# Patient Record
Sex: MALE | Race: WHITE | Employment: FULL TIME | URBAN - METROPOLITAN AREA
[De-identification: names, ages, dates, MRNs, and addresses within clinical notes are randomized per-mention and may not be internally consistent; named-entity substitution may affect disease eponyms.]

---

## 2017-04-03 ENCOUNTER — APPOINTMENT (OUTPATIENT)
Dept: GENERAL RADIOLOGY | Age: 42
End: 2017-04-03
Attending: EMERGENCY MEDICINE
Payer: SELF-PAY

## 2017-04-03 ENCOUNTER — APPOINTMENT (OUTPATIENT)
Dept: ULTRASOUND IMAGING | Age: 42
End: 2017-04-03
Attending: EMERGENCY MEDICINE
Payer: SELF-PAY

## 2017-04-03 ENCOUNTER — APPOINTMENT (OUTPATIENT)
Dept: CT IMAGING | Age: 42
End: 2017-04-03
Payer: SELF-PAY

## 2017-04-03 ENCOUNTER — HOSPITAL ENCOUNTER (OUTPATIENT)
Age: 42
Setting detail: OBSERVATION
Discharge: OTHER INSTITUTION | End: 2017-04-03
Attending: EMERGENCY MEDICINE | Admitting: INTERNAL MEDICINE
Payer: SELF-PAY

## 2017-04-03 ENCOUNTER — HOSPITAL ENCOUNTER (INPATIENT)
Age: 42
LOS: 3 days | Discharge: HOME | DRG: 811 | End: 2017-04-06
Attending: INTERNAL MEDICINE | Admitting: INTERNAL MEDICINE
Payer: SELF-PAY

## 2017-04-03 VITALS
DIASTOLIC BLOOD PRESSURE: 79 MMHG | HEART RATE: 91 BPM | SYSTOLIC BLOOD PRESSURE: 129 MMHG | TEMPERATURE: 97.4 F | WEIGHT: 256 LBS | OXYGEN SATURATION: 95 % | BODY MASS INDEX: 38.8 KG/M2 | RESPIRATION RATE: 16 BRPM | HEIGHT: 68 IN

## 2017-04-03 DIAGNOSIS — D64.9 SYMPTOMATIC ANEMIA: Primary | ICD-10-CM

## 2017-04-03 DIAGNOSIS — I82.4Y2 DEEP VEIN THROMBOSIS (DVT) OF PROXIMAL VEIN OF LEFT LOWER EXTREMITY, UNSPECIFIED CHRONICITY (HCC): ICD-10-CM

## 2017-04-03 PROBLEM — I82.402 DEEP VEIN THROMBOSIS (DVT) OF LEFT LOWER EXTREMITY (HCC): Status: ACTIVE | Noted: 2017-04-03

## 2017-04-03 PROBLEM — E66.9 OBESITY: Status: ACTIVE | Noted: 2017-04-03

## 2017-04-03 LAB
ALBUMIN SERPL BCP-MCNC: 3.8 G/DL (ref 3.5–5)
ALBUMIN/GLOB SERPL: 1 {RATIO} (ref 1.2–3.5)
ALP SERPL-CCNC: 77 U/L (ref 50–136)
ALT SERPL-CCNC: 23 U/L (ref 12–65)
ANION GAP BLD CALC-SCNC: 6 MMOL/L (ref 7–16)
AST SERPL W P-5'-P-CCNC: 14 U/L (ref 15–37)
ATRIAL RATE: 101 BPM
BILIRUB DIRECT SERPL-MCNC: 0.1 MG/DL
BILIRUB SERPL-MCNC: 0.5 MG/DL (ref 0.2–1.1)
BUN SERPL-MCNC: 17 MG/DL (ref 6–23)
CALCIUM SERPL-MCNC: 9 MG/DL (ref 8.3–10.4)
CALCULATED P AXIS, ECG09: 51 DEGREES
CALCULATED R AXIS, ECG10: 44 DEGREES
CALCULATED T AXIS, ECG11: 5 DEGREES
CHLORIDE SERPL-SCNC: 103 MMOL/L (ref 98–107)
CO2 SERPL-SCNC: 28 MMOL/L (ref 21–32)
CREAT SERPL-MCNC: 1.17 MG/DL (ref 0.8–1.5)
D DIMER PPP FEU-MCNC: 4.26 UG/ML(FEU)
DIAGNOSIS, 93000: NORMAL
DIFFERENTIAL METHOD BLD: ABNORMAL
EOSINOPHIL # BLD: 0.1 K/UL (ref 0–0.8)
EOSINOPHIL NFR BLD MANUAL: 1 % (ref 1–8)
ERYTHROCYTE [DISTWIDTH] IN BLOOD BY AUTOMATED COUNT: 19 % (ref 11.9–14.6)
FERRITIN SERPL-MCNC: 5 NG/ML (ref 8–388)
FOLATE SERPL-MCNC: 4.5 NG/ML (ref 3.1–17.5)
GLOBULIN SER CALC-MCNC: 3.7 G/DL (ref 2.3–3.5)
GLUCOSE SERPL-MCNC: 103 MG/DL (ref 65–100)
HCT VFR BLD AUTO: 26.3 % (ref 41.1–50.3)
HGB BLD-MCNC: 6.1 G/DL (ref 13.6–17.2)
HGB BLD-MCNC: 6.6 G/DL (ref 13.6–17.2)
HGB RETIC QN AUTO: 13 PG (ref 29–35)
IMM RETICS NFR: 16.3 % (ref 2.3–13.4)
INR PPP: 1.1 (ref 0.9–1.2)
IRON SATN MFR SERPL: 3 %
IRON SERPL-MCNC: 14 UG/DL (ref 35–150)
LYMPHOCYTES # BLD: 1.6 K/UL (ref 0.5–4.6)
LYMPHOCYTES NFR BLD MANUAL: 16 % (ref 16–44)
MCH RBC QN AUTO: 14.7 PG (ref 26.1–32.9)
MCHC RBC AUTO-ENTMCNC: 25.1 G/DL (ref 31.4–35)
MCV RBC AUTO: 58.6 FL (ref 79.6–97.8)
MONOCYTES # BLD: 0.5 K/UL (ref 0.1–1.3)
MONOCYTES NFR BLD MANUAL: 5 % (ref 3–9)
NEUTS SEG # BLD: 7.6 K/UL (ref 1.7–8.2)
NEUTS SEG NFR BLD MANUAL: 78 % (ref 47–75)
P-R INTERVAL, ECG05: 150 MS
PLATELET # BLD AUTO: 378 K/UL (ref 150–450)
PLATELET COMMENTS,PCOM: ADEQUATE
PMV BLD AUTO: ABNORMAL FL (ref 10.8–14.1)
POTASSIUM SERPL-SCNC: 3.6 MMOL/L (ref 3.5–5.1)
PROT SERPL-MCNC: 7.5 G/DL (ref 6.3–8.2)
PROTHROMBIN TIME: 11.7 SEC (ref 9.6–12)
Q-T INTERVAL, ECG07: 330 MS
QRS DURATION, ECG06: 90 MS
QTC CALCULATION (BEZET), ECG08: 427 MS
RBC # BLD AUTO: 4.49 M/UL (ref 4.23–5.67)
RBC MORPH BLD: ABNORMAL
RETICS # AUTO: 0.09 M/UL (ref 0.03–0.1)
RETICS/RBC NFR AUTO: 2 % (ref 0.3–2)
SODIUM SERPL-SCNC: 137 MMOL/L (ref 136–145)
TIBC SERPL-MCNC: 422 UG/DL (ref 250–450)
TROPONIN I BLD-MCNC: 0 NG/ML (ref 0–0.08)
VENTRICULAR RATE, ECG03: 101 BPM
VIT B12 SERPL-MCNC: 964 PG/ML (ref 193–986)
WBC # BLD AUTO: 9.8 K/UL (ref 4.3–11.1)

## 2017-04-03 PROCEDURE — 74011250636 HC RX REV CODE- 250/636

## 2017-04-03 PROCEDURE — 83540 ASSAY OF IRON: CPT

## 2017-04-03 PROCEDURE — 99218 HC RM OBSERVATION: CPT

## 2017-04-03 PROCEDURE — 82728 ASSAY OF FERRITIN: CPT

## 2017-04-03 PROCEDURE — 74011000250 HC RX REV CODE- 250

## 2017-04-03 PROCEDURE — 85379 FIBRIN DEGRADATION QUANT: CPT | Performed by: EMERGENCY MEDICINE

## 2017-04-03 PROCEDURE — 82746 ASSAY OF FOLIC ACID SERUM: CPT

## 2017-04-03 PROCEDURE — 80076 HEPATIC FUNCTION PANEL: CPT

## 2017-04-03 PROCEDURE — 82607 VITAMIN B-12: CPT

## 2017-04-03 PROCEDURE — 65660000000 HC RM CCU STEPDOWN

## 2017-04-03 PROCEDURE — 96361 HYDRATE IV INFUSION ADD-ON: CPT | Performed by: EMERGENCY MEDICINE

## 2017-04-03 PROCEDURE — 30233N1 TRANSFUSION OF NONAUTOLOGOUS RED BLOOD CELLS INTO PERIPHERAL VEIN, PERCUTANEOUS APPROACH: ICD-10-PCS | Performed by: INTERNAL MEDICINE

## 2017-04-03 PROCEDURE — 86920 COMPATIBILITY TEST SPIN: CPT | Performed by: INTERNAL MEDICINE

## 2017-04-03 PROCEDURE — 85610 PROTHROMBIN TIME: CPT

## 2017-04-03 PROCEDURE — 99284 EMERGENCY DEPT VISIT MOD MDM: CPT | Performed by: EMERGENCY MEDICINE

## 2017-04-03 PROCEDURE — 74011250636 HC RX REV CODE- 250/636: Performed by: EMERGENCY MEDICINE

## 2017-04-03 PROCEDURE — 85046 RETICYTE/HGB CONCENTRATE: CPT

## 2017-04-03 PROCEDURE — 93971 EXTREMITY STUDY: CPT

## 2017-04-03 PROCEDURE — 93005 ELECTROCARDIOGRAM TRACING: CPT | Performed by: EMERGENCY MEDICINE

## 2017-04-03 PROCEDURE — 84484 ASSAY OF TROPONIN QUANT: CPT

## 2017-04-03 PROCEDURE — 96365 THER/PROPH/DIAG IV INF INIT: CPT | Performed by: EMERGENCY MEDICINE

## 2017-04-03 PROCEDURE — 74011000258 HC RX REV CODE- 258: Performed by: EMERGENCY MEDICINE

## 2017-04-03 PROCEDURE — 85018 HEMOGLOBIN: CPT

## 2017-04-03 PROCEDURE — 71010 XR CHEST PORT: CPT

## 2017-04-03 PROCEDURE — 85025 COMPLETE CBC W/AUTO DIFF WBC: CPT | Performed by: EMERGENCY MEDICINE

## 2017-04-03 PROCEDURE — 71260 CT THORAX DX C+: CPT

## 2017-04-03 PROCEDURE — 74011636320 HC RX REV CODE- 636/320: Performed by: EMERGENCY MEDICINE

## 2017-04-03 PROCEDURE — 80048 BASIC METABOLIC PNL TOTAL CA: CPT | Performed by: EMERGENCY MEDICINE

## 2017-04-03 PROCEDURE — 86900 BLOOD TYPING SEROLOGIC ABO: CPT | Performed by: INTERNAL MEDICINE

## 2017-04-03 PROCEDURE — 36415 COLL VENOUS BLD VENIPUNCTURE: CPT

## 2017-04-03 PROCEDURE — C9113 INJ PANTOPRAZOLE SODIUM, VIA: HCPCS

## 2017-04-03 RX ORDER — HEPARIN SODIUM 5000 [USP'U]/100ML
18-36 INJECTION, SOLUTION INTRAVENOUS
Status: CANCELLED | OUTPATIENT
Start: 2017-04-03 | Stop reason: SDUPTHER

## 2017-04-03 RX ORDER — SODIUM CHLORIDE 9 MG/ML
250 INJECTION, SOLUTION INTRAVENOUS AS NEEDED
Status: DISCONTINUED | OUTPATIENT
Start: 2017-04-03 | End: 2017-04-04 | Stop reason: SDUPTHER

## 2017-04-03 RX ORDER — SODIUM CHLORIDE 0.9 % (FLUSH) 0.9 %
10 SYRINGE (ML) INJECTION
Status: COMPLETED | OUTPATIENT
Start: 2017-04-03 | End: 2017-04-03

## 2017-04-03 RX ORDER — SODIUM CHLORIDE 9 MG/ML
1000 INJECTION, SOLUTION INTRAVENOUS ONCE
Status: COMPLETED | OUTPATIENT
Start: 2017-04-03 | End: 2017-04-03

## 2017-04-03 RX ORDER — HEPARIN SODIUM 5000 [USP'U]/100ML
18-36 INJECTION, SOLUTION INTRAVENOUS
Status: DISCONTINUED | OUTPATIENT
Start: 2017-04-03 | End: 2017-04-03 | Stop reason: SDUPTHER

## 2017-04-03 RX ORDER — HEPARIN SODIUM 5000 [USP'U]/100ML
18-36 INJECTION, SOLUTION INTRAVENOUS
Status: DISCONTINUED | OUTPATIENT
Start: 2017-04-03 | End: 2017-04-03 | Stop reason: HOSPADM

## 2017-04-03 RX ORDER — SODIUM CHLORIDE 9 MG/ML
250 INJECTION, SOLUTION INTRAVENOUS AS NEEDED
Status: DISCONTINUED | OUTPATIENT
Start: 2017-04-03 | End: 2017-04-03 | Stop reason: HOSPADM

## 2017-04-03 RX ORDER — HEPARIN SODIUM 5000 [USP'U]/ML
5000 INJECTION, SOLUTION INTRAVENOUS; SUBCUTANEOUS ONCE
Status: COMPLETED | OUTPATIENT
Start: 2017-04-03 | End: 2017-04-03

## 2017-04-03 RX ORDER — SODIUM CHLORIDE 9 MG/ML
250 INJECTION, SOLUTION INTRAVENOUS AS NEEDED
Status: CANCELLED | OUTPATIENT
Start: 2017-04-03

## 2017-04-03 RX ADMIN — Medication 10 ML: at 18:08

## 2017-04-03 RX ADMIN — SODIUM CHLORIDE 100 ML: 900 INJECTION, SOLUTION INTRAVENOUS at 18:08

## 2017-04-03 RX ADMIN — SODIUM CHLORIDE 1000 ML: 900 INJECTION, SOLUTION INTRAVENOUS at 15:47

## 2017-04-03 RX ADMIN — IOPAMIDOL 100 ML: 755 INJECTION, SOLUTION INTRAVENOUS at 18:08

## 2017-04-03 RX ADMIN — HEPARIN SODIUM 5000 UNITS: 5000 INJECTION, SOLUTION INTRAVENOUS; SUBCUTANEOUS at 19:05

## 2017-04-03 RX ADMIN — SODIUM CHLORIDE 40 MG: 9 INJECTION INTRAMUSCULAR; INTRAVENOUS; SUBCUTANEOUS at 21:00

## 2017-04-03 RX ADMIN — HEPARIN SODIUM AND DEXTROSE 18 UNITS/KG/HR: 5000; 5 INJECTION INTRAVENOUS at 19:06

## 2017-04-03 NOTE — ED NOTES
TRANSFER - OUT REPORT:    Verbal report given to Mery Gold RN(name) on Cherri Alston  being transferred to [facility] room 607(unit) for routine progression of care       Report consisted of patients Situation, Background, Assessment and   Recommendations(SBAR). Information from the following report(s) SBAR, ED Summary, Procedure Summary, MAR and Recent Results was reviewed with the receiving nurse. Lines:   Peripheral IV 04/03/17 Right Antecubital (Active)   Site Assessment Clean, dry, & intact 4/3/2017  7:00 PM   Phlebitis Assessment 0 4/3/2017  7:00 PM   Infiltration Assessment 0 4/3/2017  3:40 PM   Dressing Status Clean, dry, & intact 4/3/2017  7:00 PM   Dressing Type Transparent 4/3/2017  3:40 PM   Hub Color/Line Status Green 4/3/2017  7:00 PM        Opportunity for questions and clarification was provided. Patient transported with:  [facility] [place] [facility].   Susy Montenegro RN

## 2017-04-03 NOTE — ED NOTES
Attempted to call report on patient to [facility]. Nurse to call back for report.       Gilma Avalos RN

## 2017-04-03 NOTE — H&P
HOSPITALIST H&P  NAME:  Ruma Tavarez   Age:  38 y.o.  :   1975   MRN:   [id number]  PCP: None  Treatment Team: Attending Provider: Jasbir Maria MD  FULL CODE  HPI:   Please refer to the 4/3/17 admission H&P from [facility], [facility] for details of presentation. In summary, Ruma Tavarez is a 38 y.o. male that presented to the ED with 2 week history of dyspnea. He denied associated chest pain. He also reported LLE pain and swelling several weeks ago that spontaneously resolved. 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 Work up in the ED showed Hgb 6.6 with stool reportedly heme negative and LLE DVT. He denied overt blood loss including hematemesis, coffee ground emesis, hematochezia, melena, or hematuria. He routinely takes Ibuprofen 200 mg once daily. The patient was initially admitted to [facility], [facility] and has been seen and examined with supervising physician, Dr Yaya Martins; he is stable and requires transfer downtown for further evaluation and treatment. Dr Yaya Martins has discussed patient with accepting hospitalist Dr. Franck Schulte and well as with Dr. Lou James with GI.         Results summary of Diagnostic Studies/Procedures copied from within [facility] EMR:  CXR  IMPRESSION: No acute airspace disease.      LLE US   FINDINGS: There is normal flow in the common femoral vein. Abnormal intraluminal  echoes in the femoral vein and popliteal vein and calf veins. There is  noncompressibility.   IMPRESSION: Positive for deep venous thrombosis left lower extremity    Complete ROS done and is as stated in HPI or otherwise negative  Past Medical History:   Diagnosis Date    Ill-defined condition     Heart murmur       Past Surgical History:   Procedure Laterality Date    HX ORTHOPAEDIC      right foot         No Known Allergies   Social History   Substance Use Topics    Smoking status: Never Smoker    Smokeless tobacco: Not on file    Alcohol use No      Family History   Problem Relation Age of Onset    Lupus Mother         Objective:     Visit Vitals    /71    Pulse 93    Temp 97.4 °F (36.3 °C)    Resp 13    Ht 5' 8\" (1.727 m)    Wt 116.1 kg (256 lb)    SpO2 97%    BMI 38.92 kg/m2     Temp (24hrs), Av.4 °F (36.3 °C), Min:97.4 °F (36.3 °C), Max:97.4 °F (36.3 °C)     Physical Exam:  General:    Alert, cooperative, no distress, appears stated age. Head:   Normocephalic, without obvious abnormality, atraumatic. Nose:  Nares normal. No drainage or sinus tenderness. Lungs:   CTA  Heart:  Tachycardic and regular   Abdomen:   Soft, non-tender. Not distended. Bowel sounds normal. No masses  Extremities: No cyanosis. No edema. No clubbing  Skin:     Texture, turgor normal. No rashes or lesions. Not Jaundiced  Neurologic: Alert and oriented times 4, non-focal   Data Review:   Recent Results (from the past 24 hour(s))   EKG, 12 LEAD, INITIAL    Collection Time: 17  2:41 PM   Result Value Ref Range    Ventricular Rate 101 BPM    Atrial Rate 101 BPM    P-R Interval 150 ms    QRS Duration 90 ms    Q-T Interval 330 ms    QTC Calculation (Bezet) 427 ms    Calculated P Axis 51 degrees    Calculated R Axis 44 degrees    Calculated T Axis 5 degrees    Diagnosis       !! AGE AND GENDER SPECIFIC ECG ANALYSIS !!   Sinus tachycardia  ST & T wave abnormality, consider inferior ischemia  Abnormal ECG  No previous ECGs available  Confirmed by Darrel Conner MD (), ARNULFO ARAUJO ([id number]) on 4/3/2017 3:11:08 PM     POC TROPONIN-I    Collection Time: 17  3:34 PM   Result Value Ref Range    Troponin-I (POC) 0 0.0 - 0.08 ng/ml   CBC WITH AUTOMATED DIFF    Collection Time: 17  3:40 PM   Result Value Ref Range    WBC 9.8 4.3 - 11.1 K/uL    RBC 4.49 4.23 - 5.67 M/uL    HGB 6.6 (LL) 13.6 - 17.2 g/dL    HCT 26.3 (L) 41.1 - 50.3 %    MCV 58.6 (L) 79.6 - 97.8 FL    MCH 14.7 (L) 26.1 - 32.9 PG    MCHC 25.1 (L) 31.4 - 35.0 g/dL    RDW 19.0 (H) 11.9 - 14.6 %    PLATELET 135 090 - 141 K/uL    MPV Cannot be calulated 10.8 - 14.1 FL    NEUTROPHILS 78 (H) 47 - 75 %    LYMPHOCYTES 16 16 - 44 %    MONOCYTES 5 3 - 9 %    EOSINOPHILS 1 1 - 8 %    ABS. NEUTROPHILS 7.6 1.7 - 8.2 K/UL    ABS. LYMPHOCYTES 1.6 0.5 - 4.6 K/UL    ABS. MONOCYTES 0.5 0.1 - 1.3 K/UL    ABS. EOSINOPHILS 0.1 0.0 - 0.8 K/UL    RBC COMMENTS OCCASIONAL  ANISOCYTOSIS        RBC COMMENTS SLIGHT  HYPOCHROMIA        RBC COMMENTS MODERATE  POLYCHROMASIA        RBC COMMENTS OCCASIONAL  OVALOCYTES        PLATELET COMMENTS ADEQUATE      DF MANUAL     METABOLIC PANEL, BASIC    Collection Time: 04/03/17  3:40 PM   Result Value Ref Range    Sodium 137 136 - 145 mmol/L    Potassium 3.6 3.5 - 5.1 mmol/L    Chloride 103 98 - 107 mmol/L    CO2 28 21 - 32 mmol/L    Anion gap 6 (L) 7 - 16 mmol/L    Glucose 103 (H) 65 - 100 mg/dL    BUN 17 6 - 23 MG/DL    Creatinine 1.17 0.8 - 1.5 MG/DL    GFR est AA >60 >60 ml/min/1.73m2    GFR est non-AA >60 >60 ml/min/1.73m2    Calcium 9.0 8.3 - 10.4 MG/DL   D DIMER    Collection Time: 04/03/17  3:40 PM   Result Value Ref Range    D DIMER 4.26 (HH) <0.55 ug/ml(FEU)   PROTHROMBIN TIME + INR    Collection Time: 04/03/17  3:40 PM   Result Value Ref Range    Prothrombin time 11.7 9.6 - 12.0 sec    INR 1.1 0.9 - 1.2     HEPATIC FUNCTION PANEL    Collection Time: 04/03/17  3:40 PM   Result Value Ref Range    Protein, total 7.5 6.3 - 8.2 g/dL    Albumin 3.8 3.5 - 5.0 g/dL    Globulin 3.7 (H) 2.3 - 3.5 g/dL    A-G Ratio 1.0 (L) 1.2 - 3.5      Bilirubin, total 0.5 0.2 - 1.1 MG/DL    Bilirubin, direct 0.1 <0.4 MG/DL    Alk.  phosphatase 77 50 - 136 U/L    AST (SGOT) 14 (L) 15 - 37 U/L    ALT (SGPT) 23 12 - 65 U/L   RETICULOCYTE COUNT    Collection Time: 04/03/17  3:40 PM   Result Value Ref Range    Reticulocyte count 2.0 0.3 - 2.0 %    Absolute Retic Cnt. 0.0876 0.026 - 0.095 M/ul    Immature Retic Fraction 16.3 (H) 2.3 - 13.4 %    Retic Hgb Conc. 13 (L) 29 - 35 pg   FERRITIN    Collection Time: 04/03/17  3:40 PM   Result Value Ref Range    Ferritin 5 (L) 8 - 388 NG/ML   TRANSFERRIN SATURATION    Collection Time: 04/03/17  3:40 PM   Result Value Ref Range    Iron 14 (L) 35 - 150 ug/dL    TIBC 422 250 - 450 ug/dL    Transferrin Saturation 3 (L) >20 %       Assessment and Plan:           Active Hospital Problems     Diagnosis Date Noted    Symptomatic anemia 04/03/2017    Obesity 04/03/2017    Deep vein thrombosis (DVT) of left lower extremity (HCC) 04/03/2017   Admit to remote telemetry   IV PPI  Transfuse 2 PRBC  Hgb q8h  Consult GI  NPO after midnight   Heparin infusion   Consult IR for IVC filter  CT Chest, Abdomen and Pelvis   Follow up labs   Estimated length of stay:>2 midnights   Benoit Mcintyre, PA

## 2017-04-03 NOTE — H&P
HOSPITALIST H&P  NAME:  Ninoska Jacobo   Age:  38 y.o.  :   1975   MRN:   [id number]  PCP: None  Treatment Team: Attending Provider: Forest Packer DO; Primary Nurse: Myke Bonilla, MYRANDA; Care Manager: Belkis Carbone RN  No Order   HPI:   Ninoska Jacobo is a 38 y.o. male that presented to the ED with 2 week history of dyspnea. He denies associated chest pain. He also reports LLE pain and swelling several weeks ago that spontaneously resolved. 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 Work up in the ED shows Hgb 6.6 with stool reportedly heme negative and LLE DVT. He denies overt blood loss including hematemesis, coffee ground emesis, hematochezia, melena, or hematuria. He routinely takes Ibuprofen 200 mg once daily. The hospitalist have been asked to admit. Results summary of Diagnostic Studies/Procedures copied from within [facility] EMR:   CXR  IMPRESSION: No acute airspace disease. LLE US   FINDINGS: There is normal flow in the common femoral vein. Abnormal intraluminal  echoes in the femoral vein and popliteal vein and calf veins. There is  noncompressibility.   IMPRESSION: Positive for deep venous thrombosis left lower extremity       Complete ROS done and is as stated in HPI or otherwise negative  Past Medical History:   Diagnosis Date    Ill-defined condition     Heart murmur       Past Surgical History:   Procedure Laterality Date    HX ORTHOPAEDIC      right foot       None     No Known Allergies   Social History   Substance Use Topics    Smoking status: Never Smoker    Smokeless tobacco: Not on file    Alcohol use No      Family History   Problem Relation Age of Onset    Lupus Mother          Objective:     Visit Vitals    /90 (BP 1 Location: Left arm, BP Patient Position: Sitting)    Pulse (!) 111    Temp 97.4 °F (36.3 °C)    Resp 22    Ht 5' 8\" (1.727 m)    Wt 116.1 kg (256 lb)    SpO2 96%    BMI 38.92 kg/m2      Temp (24hrs), Av.4 °F (36.3 °C), Min:97.4 °F (36.3 °C), Max:97.4 °F (36.3 °C)    Oxygen Therapy  O2 Sat (%): 96 % (17 1423)  O2 Device: Room air (17 1423)  Physical Exam:  General:    Alert, cooperative, no distress, appears stated age. Head:   Normocephalic, without obvious abnormality, atraumatic. Nose:  Nares normal. No drainage or sinus tenderness. Lungs:   CTA  Heart:  Tachycardic and regular   Abdomen:   Soft, non-tender. Not distended. Bowel sounds normal. No masses  Extremities: No cyanosis. No edema. No clubbing  Skin:     Texture, turgor normal. No rashes or lesions. Not Jaundiced  Neurologic: Alert and oriented times 4, non-focal   Data Review:   Recent Results (from the past 24 hour(s))   EKG, 12 LEAD, INITIAL    Collection Time: 17  2:41 PM   Result Value Ref Range    Ventricular Rate 101 BPM    Atrial Rate 101 BPM    P-R Interval 150 ms    QRS Duration 90 ms    Q-T Interval 330 ms    QTC Calculation (Bezet) 427 ms    Calculated P Axis 51 degrees    Calculated R Axis 44 degrees    Calculated T Axis 5 degrees    Diagnosis       !! AGE AND GENDER SPECIFIC ECG ANALYSIS !!   Sinus tachycardia  ST & T wave abnormality, consider inferior ischemia  Abnormal ECG  No previous ECGs available  Confirmed by Raymundo Bustamante MD (), ARNULFO ARAUJO ([id number]) on 4/3/2017 3:11:08 PM     POC TROPONIN-I    Collection Time: 17  3:34 PM   Result Value Ref Range    Troponin-I (POC) 0 0.0 - 0.08 ng/ml   CBC WITH AUTOMATED DIFF    Collection Time: 17  3:40 PM   Result Value Ref Range    WBC 9.8 4.3 - 11.1 K/uL    RBC 4.49 4.23 - 5.67 M/uL    HGB 6.6 (LL) 13.6 - 17.2 g/dL    HCT 26.3 (L) 41.1 - 50.3 %    MCV 58.6 (L) 79.6 - 97.8 FL    MCH 14.7 (L) 26.1 - 32.9 PG    MCHC 25.1 (L) 31.4 - 35.0 g/dL    RDW 19.0 (H) 11.9 - 14.6 %    PLATELET 102 960 - 907 K/uL    MPV Cannot be calulated 10.8 - 14.1 FL    NEUTROPHILS 78 (H) 47 - 75 %    LYMPHOCYTES 16 16 - 44 %    MONOCYTES 5 3 - 9 % EOSINOPHILS 1 1 - 8 %    ABS. NEUTROPHILS 7.6 1.7 - 8.2 K/UL    ABS. LYMPHOCYTES 1.6 0.5 - 4.6 K/UL    ABS. MONOCYTES 0.5 0.1 - 1.3 K/UL    ABS. EOSINOPHILS 0.1 0.0 - 0.8 K/UL    RBC COMMENTS OCCASIONAL  ANISOCYTOSIS        RBC COMMENTS SLIGHT  HYPOCHROMIA        RBC COMMENTS MODERATE  POLYCHROMASIA        RBC COMMENTS OCCASIONAL  OVALOCYTES        PLATELET COMMENTS ADEQUATE      DF MANUAL     METABOLIC PANEL, BASIC    Collection Time: 04/03/17  3:40 PM   Result Value Ref Range    Sodium 137 136 - 145 mmol/L    Potassium 3.6 3.5 - 5.1 mmol/L    Chloride 103 98 - 107 mmol/L    CO2 28 21 - 32 mmol/L    Anion gap 6 (L) 7 - 16 mmol/L    Glucose 103 (H) 65 - 100 mg/dL    BUN 17 6 - 23 MG/DL    Creatinine 1.17 0.8 - 1.5 MG/DL    GFR est AA >60 >60 ml/min/1.73m2    GFR est non-AA >60 >60 ml/min/1.73m2    Calcium 9.0 8.3 - 10.4 MG/DL   D DIMER    Collection Time: 04/03/17  3:40 PM   Result Value Ref Range    D DIMER 4.26 (HH) <0.55 ug/ml(FEU)   PROTHROMBIN TIME + INR    Collection Time: 04/03/17  3:40 PM   Result Value Ref Range    Prothrombin time 11.7 9.6 - 12.0 sec    INR 1.1 0.9 - 1.2     HEPATIC FUNCTION PANEL    Collection Time: 04/03/17  3:40 PM   Result Value Ref Range    Protein, total 7.5 6.3 - 8.2 g/dL    Albumin 3.8 3.5 - 5.0 g/dL    Globulin 3.7 (H) 2.3 - 3.5 g/dL    A-G Ratio 1.0 (L) 1.2 - 3.5      Bilirubin, total 0.5 0.2 - 1.1 MG/DL    Bilirubin, direct 0.1 <0.4 MG/DL    Alk.  phosphatase 77 50 - 136 U/L    AST (SGOT) 14 (L) 15 - 37 U/L    ALT (SGPT) 23 12 - 65 U/L   RETICULOCYTE COUNT    Collection Time: 04/03/17  3:40 PM   Result Value Ref Range    Reticulocyte count 2.0 0.3 - 2.0 %    Absolute Retic Cnt. 0.0876 0.026 - 0.095 M/ul    Immature Retic Fraction 16.3 (H) 2.3 - 13.4 %    Retic Hgb Conc. 13 (L) 29 - 35 pg   FERRITIN    Collection Time: 04/03/17  3:40 PM   Result Value Ref Range    Ferritin 5 (L) 8 - 388 NG/ML   TRANSFERRIN SATURATION    Collection Time: 04/03/17  3:40 PM   Result Value Ref Range Iron 14 (L) 35 - 150 ug/dL    TIBC 422 250 - 450 ug/dL    Transferrin Saturation 3 (L) >20 %       Assessment and Plan:      Active Hospital Problems    Diagnosis Date Noted    Symptomatic anemia 04/03/2017    Obesity 04/03/2017    Deep vein thrombosis (DVT) of left lower extremity (HCC) 04/03/2017   Admit to remote telemetry   IV PPI  Transfuse 2 PRBC  Hgb q8h  Consult GI  NPO after midnight   Heparin infusion   Consult IR for IVC filter  CT Chest, Abdomen and Pelvis   Follow up labs   Estimated length of stay:>2 midnights   Arline Salcido., PA

## 2017-04-03 NOTE — ED NOTES
Attempted to call report, primary RN nor charge nurse available to take report. Nurse to call back for report.       Elder Savage RN

## 2017-04-03 NOTE — PROGRESS NOTES
Visited with patient as no PCP listed in chart. Wife Alondra Pham is at bedside. Patient states he does not have a PCP as he has no insurance. Explained importance of being established with a PCP. Patient has recently relocated from [place], [place] and is residing in [place]. Call to Mayra Osborn with [facility] who confirms that patient would not qualify for [facility] and they do not have [facility] in [place]. Resources provided and explained for [place] free clinic as well as [employer]. Encouraged patient to call and get established.

## 2017-04-03 NOTE — ED PROVIDER NOTES
Patient is a 38 y.o. male presenting with shortness of breath. The history is provided by the patient and the spouse. Shortness of Breath   This is a recurrent problem. The problem occurs frequently. The current episode started more than 1 week ago. The problem has not changed since onset. Associated symptoms include leg swelling. Pertinent negatives include no fever, no rhinorrhea, no sore throat, no cough, no hemoptysis, no chest pain, no syncope, no vomiting and no leg pain. He has tried nothing for the symptoms. He has had no prior hospitalizations. He has had no prior ED visits. He has had no prior ICU admissions. Associated medical issues do not include PE, CAD, past MI or DVT. Past Medical History:   Diagnosis Date    Ill-defined condition     Heart murmur        Past Surgical History:   Procedure Laterality Date    HX ORTHOPAEDIC      right foot          History reviewed. No pertinent family history. Social History     Social History    Marital status:      Spouse name: N/A    Number of children: N/A    Years of education: N/A     Occupational History    Not on file. Social History Main Topics    Smoking status: Never Smoker    Smokeless tobacco: Not on file    Alcohol use No    Drug use: Not on file    Sexual activity: Not on file     Other Topics Concern    Not on file     Social History Narrative    No narrative on file         ALLERGIES: Review of patient's allergies indicates no known allergies. Review of Systems   Constitutional: Negative. Negative for fever. HENT: Negative for rhinorrhea and sore throat. Respiratory: Positive for shortness of breath. Negative for cough and hemoptysis. Cardiovascular: Positive for leg swelling. Negative for chest pain and syncope. Gastrointestinal: Negative. Negative for vomiting. Endocrine: Negative. Genitourinary: Negative. Musculoskeletal: Negative. Skin: Negative. Neurological: Negative. Hematological: Negative. Vitals:    04/03/17 1423   BP: 123/90   Pulse: (!) 111   Resp: 22   Temp: 97.4 °F (36.3 °C)   SpO2: 96%   Weight: 116.1 kg (256 lb)   Height: 5' 8\" (1.727 m)            Physical Exam   Constitutional: He is oriented to person, place, and time. He appears well-developed and well-nourished. No distress. HENT:   Head: Normocephalic and atraumatic. Cardiovascular: Intact distal pulses. Pulmonary/Chest: Effort normal. No respiratory distress. He has no wheezes. He has no rales. Abdominal: Soft. He exhibits no distension. There is no tenderness. There is no rebound. Genitourinary: Rectal exam shows no external hemorrhoid and guaiac negative stool. Musculoskeletal: He exhibits no edema. Neurological: He is alert and oriented to person, place, and time. Skin: Skin is warm and dry. Psychiatric: He has a normal mood and affect. His behavior is normal.   Nursing note and vitals reviewed. MDM  Number of Diagnoses or Management Options  Symptomatic anemia: new and requires workup  Diagnosis management comments: Patient states that he is primarily having symptoms with exertion and is relatively symptomatically while at rest.  Denies any pain but has a significant shortness of breath and easily fatigued. Medicine having a past medical history per patient. Denies taking any chronic medications. Was recently on antibiotics and steroids after being seen in an urgent care for same complaint. Denies tobacco abuse, drug abuse or over-the-counter supplementations. Reviewed findings with patient and spouse.        Amount and/or Complexity of Data Reviewed  Clinical lab tests: ordered and reviewed (Results for orders placed or performed during the hospital encounter of 04/03/17  -CBC WITH AUTOMATED DIFF       Result                                            Value                         Ref Range                       WBC                                               9.8 4.3 - 11.1 K/uL                 RBC                                               4.49                          4.23 - 5.67 M/uL                HGB                                               6.6 (LL)                      13.6 - 17.2 g/dL                HCT                                               26.3 (L)                      41.1 - 50.3 %                   MCV                                               58.6 (L)                      79.6 - 97.8 FL                  MCH                                               14.7 (L)                      26.1 - 32.9 PG                  MCHC                                              25.1 (L)                      31.4 - 35.0 g/dL                RDW                                               19.0 (H)                      11.9 - 14.6 %                   PLATELET                                          378                           150 - 450 K/uL                  MPV                                               Cannot be calulated           10.8 - 14.1 FL                  DF                                                PENDING                                                  -METABOLIC PANEL, BASIC       Result                                            Value                         Ref Range                       Sodium                                            137                           136 - 145 mmol/L                Potassium                                         3.6                           3.5 - 5.1 mmol/L                Chloride                                          103                           98 - 107 mmol/L                 CO2                                               28                            21 - 32 mmol/L                  Anion gap                                         6 (L)                         7 - 16 mmol/L                   Glucose                                           103 (H) 65 - 100 mg/dL                  BUN                                               17                            6 - 23 MG/DL                    Creatinine                                        1.17                          0.8 - 1.5 MG/DL                 GFR est AA                                        >60                           >60 ml/min/1.73m2               GFR est non-AA                                    >60                           >60 ml/min/1.73m2               Calcium                                           9.0                           8.3 - 10.4 MG/DL           -D DIMER       Result                                            Value                         Ref Range                       D DIMER                                           4.26 (HH)                     <0.55 ug/ml(FEU)           -POC TROPONIN-I       Result                                            Value                         Ref Range                       Troponin-I (POC)                                  0                             0.0 - 0.08 ng/ml           -EKG, 12 LEAD, INITIAL       Result                                            Value                         Ref Range                       Ventricular Rate                                  101                           BPM                             Atrial Rate                                       101                           BPM                             P-R Interval                                      150                           ms                              QRS Duration                                      90                            ms                              Q-T Interval                                      330                           ms                              QTC Calculation (Bezet)                           427                           ms                              Calculated P Axis                                 51 degrees                         Calculated R Axis                                 44                            degrees                         Calculated T Axis                                 5                             degrees                         Diagnosis                                                                                                   !! AGE AND GENDER SPECIFIC ECG ANALYSIS !! Sinus tachycardia   ST & T wave abnormality, consider inferior ischemia   Abnormal ECG   No previous ECGs available   Confirmed by [facility] IGNACIA EGAN (), ARNULFO ARAUJO ([id number]) on 4/3/2017 3:11:08 PM     )  Tests in the radiology section of CPT®: reviewed and ordered (Xr Chest Port    Result Date: 4/3/2017  Chest portable CLINICAL INDICATION: Acute moderate dyspnea COMPARISON: None TECHNIQUE: single AP portable view chest at 2:30 PM semiupright FINDINGS:  There is no evidence of consolidation, pneumothorax, pleural effusion or pulmonary edema. Unremarkable hilar contours. Heart is borderline enlarged. There is smoothly contoured retrocardiac density along midline within the inferior mediastinum, most compatible with hiatal hernia rather than aortic dilatation. The bones are unremarkable     IMPRESSION: No acute airspace disease.      )      ED Course       Procedures

## 2017-04-03 NOTE — DISCHARGE SUMMARY
Hospitalist Discharge Summary   Patient ID:  Cherylene Mullet  [id number]  12 y.o.  1975  Admit date: 4/3/2017  2:28 PM  Discharge date and time: 4/3/2017  Attending: Fernanda Walter DO  PCP:  None  Treatment Team: Attending Provider: Fernanda Walter DO; Primary Nurse: Jarvis Libman, RN; Care Manager: Geovany Short, MYRANDA; Consulting Provider: Simin Massey MD  Principal Diagnosis <principal problem not specified>   Active Problems:    Symptomatic anemia (4/3/2017)      Obesity (4/3/2017)      Deep vein thrombosis (DVT) of left lower extremity ([facility] [address] (4/3/2017)       * Admission Diagnoses: symptomatic anemia  Symptomatic anemia  * Discharge Diagnoses:    Hospital Problems as of 4/3/2017  Never Reviewed          Codes Class Noted - Resolved POA    Symptomatic anemia ICD-10-CM: D64.9  ICD-9-CM: 285.9  4/3/2017 - Present Unknown        Obesity ICD-10-CM: E66.9  ICD-9-CM: 278.00  4/3/2017 - Present Unknown        Deep vein thrombosis (DVT) of left lower extremity ([facility] [address] ICD-10-CM: I82.402  ICD-9-CM: 453.40  4/3/2017 - Present Unknown            Hospital Course:  Please refer to the admission H&P for details of presentation. In summary, Cherylene Mullet is a 38 y.o. male that presented to the ED with 2 week history of dyspnea. He denied associated chest pain. He also reported LLE pain and swelling several weeks ago that spontaneously resolved. 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 Work up in the ED showed Hgb 6.6 with stool reportedly heme negative and LLE DVT. He denied overt blood loss including hematemesis, coffee ground emesis, hematochezia, melena, or hematuria. He routinely takes Ibuprofen 200 mg once daily. The patient was initially admitted to [facility], [facility] and has been seen and examined with supervising physician, Dr Misty Peterson; he is stable and requires transfer downtown for further evaluation and treatment.  Dr Misty Peterson has discussed patient with accepting hospitalist Dr. Severiano Mediate and well as with Dr. Nanci Henderson with GI. Results summary of Diagnostic Studies/Procedures copied from within [facility] EMR:  CXR  IMPRESSION: No acute airspace disease.     LLE US   FINDINGS: There is normal flow in the common femoral vein. Abnormal intraluminal  echoes in the femoral vein and popliteal vein and calf veins. There is  noncompressibility. IMPRESSION: Positive for deep venous thrombosis left lower extremity            Labs: Results:       Chemistry Recent Labs      04/03/17   1540   GLU  103*   NA  137   K  3.6   CL  103   CO2  28   BUN  17   CREA  1.17   CA  9.0   AGAP  6*   TBILI  0.5   ALT  23   AP  77   TP  7.5   ALB  3.8   GLOB  3.7*   AGRAT  1.0*      CBC w/Diff Recent Labs      04/03/17   1540   WBC  9.8   RBC  4.49   HGB  6.6*   HCT  26.3*   PLT  378   GRANS  78*   LYMPH  16   EOS  1          Coagulation Recent Labs      04/03/17   1540   PTP  11.7   INR  1.1               Liver Enzymes Recent Labs      04/03/17   1540   TP  7.5   ALB  3.8   AP  77   SGOT  14*              Discharge Exam:  Visit Vitals    /71    Pulse 93    Temp 97.4 °F (36.3 °C)    Resp 13    Ht 5' 8\" (1.727 m)    Wt 116.1 kg (256 lb)    SpO2 97%    BMI 38.92 kg/m2     General appearance: alert, cooperative, no distress, appears stated age  Lungs: clear to auscultation bilaterally  Heart: regular rate and rhythm  Abdomen: soft, non-tender.  Bowel sounds normal.   Extremities: no cyanosis or edema    Current Facility-Administered Medications   Medication Dose Route Frequency    0.9% sodium chloride infusion 250 mL  250 mL IntraVENous PRN    heparin (porcine) injection 5,000 Units  5,000 Units IntraVENous ONCE    heparin 25,000 units in dextrose 500 mL infusion  18-36 Units/kg/hr IntraVENous TITRATE    pantoprazole (PROTONIX) 40 mg in sodium chloride 0.9 % 10 mL injection  40 mg IntraVENous Q12H           Disposition: Admify Discharge Condition: stable  Time spent to discharge patient <30 minutes   Signed:  JEANIE Eddy  4/3/2017  6:28 PM

## 2017-04-03 NOTE — ED TRIAGE NOTES
Patient c/o increasing dry cough, fatigue and shortness of breath x 2 months. Patient c/o posterior left calf pain yesterday but has resolved today.

## 2017-04-03 NOTE — Clinical Note
Patient Class[de-identified] Observation [002] Type of Bed: Medical [8] Reason for Observation: symptomatic anemia Admitting Physician: Melisa Marques [[id number]] Attending Physician: Melisa Marques [[id number]] Diagnosis: symptomatic anemia

## 2017-04-03 NOTE — IP AVS SNAPSHOT
[facility] 
 
 
 [address] [place] [address] 322 [facility] 
[phone] Patient: Phuong Knight MRN: [id number] AGL:[birth year] You are allergic to the following No active allergies Recent Documentation Weight BMI Smoking Status 116.1 kg 38.92 kg/m2 Never Smoker Unresulted Labs Order Current Status CELIAC ANTIBODY PROFILE In process Emergency Contacts Name Discharge Info Relation Home Work Mobile Ange Jeong  Spouse [3]   [phone] About your hospitalization You were admitted on:  April 3, 2017 You last received care in the:  [facility] 6 MED SURG You were discharged on:  April 6, 2017 Unit phone number:  [phone] Why you were hospitalized Your primary diagnosis was:  Not on File Your diagnoses also included:  Symptomatic Anemia Providers Seen During Your Hospitalizations Provider Role Specialty Primary office phone Mendoza Santa MD Attending Provider Internal Medicine [phone] Your Primary Care Physician (PCP) Primary Care Physician Office Phone Office Fax NONE ** None ** ** None ** Follow-up Information Follow up With Details Comments Contact Info [facility]   Monday April 17 at 8:20 am 
 
[facility]  they will contact you for further testing [facility]  
[address] [facility] [address] [phone] Kevin Adames MD On 4/19/2017 11:00 [address] [facility] [address] 9181 [facility] [address] 
[phone] Your Appointments Thursday April 13, 2017 11:00 AM EDT Infusion with FLR5 INF[facility] ([address] [place] [address]) 5th Floor Infusion [address] [facility] [address] Jose Magaña [phone]  [facility] [address]  
[phone] For [facility] AND SURGICAL [facility] Floor [phone] ext. [phone] [facility]  Wednesday April 19, 2017 11:00 AM EDT  
 LAB with [address]  
1808 [facility] OUTREACH INSURANCE ([address]) Stephanie Mcgee 426 187 [facility]  
[phone] Wednesday April 19, 2017 11:30 AM EDT HOSPITAL FOLLOW-UP with Joselin Nava MD  
[facility] Hematology and Oncology [facility]) C/ John Back [address] [facility] [address]  
[phone] Thursday April 20, 2017  4:00 PM EDT Infusion with FLR5 INF[facility] ([address] [facility]) 5th Floor Infusion [address] [facility] Sean Damico [phone]  [facility] [address]  
[phone] For [facility] SURGICAL \[facility]\"" Floor [phone] ext. 3201 [facility] Current Discharge Medication List  
  
START taking these medications Dose & Instructions Dispensing Information Comments Morning Noon Evening Bedtime  
 ferrous sulfate 325 mg (65 mg iron) tablet Your next dose is: Today with supper Dose:  325 mg Take 1 Tab by mouth three (3) times daily (with meals). Quantity:  90 Tab Refills:  1  
     
   
   
  
   
  
 folic acid 1 mg tablet Commonly known as:  Google Your next dose is:  4/7 Dose:  1 mg Take 1 Tab by mouth daily. Quantity:  30 Tab Refills:  0  
     
   
   
   
  
 pantoprazole 40 mg tablet Commonly known as:  PROTONIX Your next dose is:  4/7 Dose:  40 mg Take 1 Tab by mouth daily. Quantity:  30 Tab Refills:  0  
     
   
   
   
  
 * rivaroxaban 15 mg Tab tablet Commonly known as:  Rica Better Your next dose is: Take twice a day with meals (breakfast and supper) Dose:  15 mg Take 1 Tab by mouth two (2) times daily (with meals) for 21 days. Quantity:  42 Tab Refills:  0  
     
   
   
   
  
 * rivaroxaban 20 mg Tab tablet Commonly known as:  Rica Better Your next dose is:  After 21 days of taking twice per day Dose:  20 mg Take 1 Tab by mouth daily (with dinner). Quantity:  30 Tab Refills:  0 traMADol 50 mg tablet Commonly known as:  ULTRAM  
Your next dose is:  As needed Dose:  50 mg Take 1 Tab by mouth every eight (8) hours as needed for Pain. Max Daily Amount: 150 mg.  
 Quantity:  40 Tab Refills:  0  
     
   
   
   
  
 * Notice: This list has 2 medication(s) that are the same as other medications prescribed for you. Read the directions carefully, and ask your doctor or other care provider to review them with you. Where to Get Your Medications Information on where to get these meds will be given to you by the nurse or doctor. ! Ask your nurse or doctor about these medications  
  ferrous sulfate 325 mg (65 mg iron) tablet  
 folic acid 1 mg tablet  
 pantoprazole 40 mg tablet  
 rivaroxaban 15 mg Tab tablet  
 rivaroxaban 20 mg Tab tablet  
 traMADol 50 mg tablet Discharge Instructions DISCHARGE SUMMARY from Nurse The following personal items are in your possession at time of discharge: 
 
Dental Appliances: None Home Medications: None Jewelry: None Clothing: At bedside Other Valuables: Cell Phone, BooknGoer PATIENT INSTRUCTIONS: 
 
 
F-face looks uneven A-arms unable to move or move unevenly S-speech slurred or non-existent T-time-call 911 as soon as signs and symptoms begin-DO NOT go Back to bed or wait to see if you get better-TIME IS BRAIN. Warning Signs of HEART ATTACK Call 911 if you have these symptoms: 
? Chest discomfort.  Most heart attacks involve discomfort in the center of the chest that lasts more than a few minutes, or that goes away and comes back. It can feel like uncomfortable pressure, squeezing, fullness, or pain. ? Discomfort in other areas of the upper body. Symptoms can include pain or discomfort in one or both arms, the back, neck, jaw, or stomach. ? Shortness of breath with or without chest discomfort. ? Other signs may include breaking out in a cold sweat, nausea, or lightheadedness. Don't wait more than five minutes to call [address]! Fast action can save your life. Calling 911 is almost always the fastest way to get lifesaving treatment. Emergency Medical Services staff can begin treatment when they arrive  up to an hour sooner than if someone gets to the hospital by car. The discharge information has been reviewed with the patient. The patient verbalized understanding. Discharge medications reviewed with the patient and appropriate educational materials and side effects teaching were provided. Anemia: Care Instructions Your Care Instructions Anemia is a low level of red blood cells, which carry oxygen throughout your body. Many things can cause anemia. Lack of iron is one of the most common causes. Your body needs iron to make hemoglobin, a substance in red blood cells that carries oxygen from the lungs to your body's cells. Without enough iron, the body produces fewer and smaller red blood cells. As a result, your body's cells do not get enough oxygen, and you feel tired and weak. And you may have trouble concentrating. Bleeding is the most common cause of a lack of iron. You may have heavy menstrual bleeding or bleeding caused by conditions such as ulcers, hemorrhoids, or cancer. Regular use of aspirin or other anti-inflammatory medicines (such as ibuprofen) also can cause bleeding in some people. A lack of iron in your diet also can cause anemia, especially at times when the body needs more iron, such as during pregnancy, infancy, and the teen years. Your doctor may have prescribed iron pills. It may take several months of treatment for your iron levels to return to normal. Your doctor also may suggest that you eat foods that are rich in iron, such as meat and beans. There are many other causes of anemia. It is not always due to a lack of iron. Finding the specific cause of your anemia will help your doctor find the right treatment for you. Follow-up care is a key part of your treatment and safety. Be sure to make and go to all appointments, and call your doctor if you are having problems. It's also a good idea to know your test results and keep a list of the medicines you take. How can you care for yourself at home? · Take your medicines exactly as prescribed. Call your doctor if you think you are having a problem with your medicine. · If your doctor recommends iron pills, take them as directed: ¨ Try to take the pills on an empty stomach about 1 hour before or 2 hours after meals. But you may need to take iron with food to avoid an upset stomach. ¨ Do not take antacids or drink milk or caffeine drinks (such as coffee, tea, or cola) at the same time or within 2 hours of the time that you take your iron. They can make it hard for your body to absorb the iron. ¨ Vitamin C (from food or supplements) helps your body absorb iron. Try taking iron pills with a glass of orange juice or some other food that is high in vitamin C, such as citrus fruits. ¨ Iron pills may cause stomach problems, such as heartburn, nausea, diarrhea, constipation, and cramps. Be sure to drink plenty of fluids, and include fruits, vegetables, and fiber in your diet each day. Iron pills often make your bowel movements dark or green. ¨ If you forget to take an iron pill, do not take a double dose of iron the next time you take a pill. ¨ Keep iron pills out of the reach of small children. An overdose of iron can be very dangerous. · Follow your doctor's advice about eating iron-rich foods. These include red meat, shellfish, poultry, eggs, beans, raisins, whole-grain bread, and leafy green vegetables. · Steam vegetables to help them keep their iron content. When should you call for help? Call 911 anytime you think you may need emergency care. For example, call if: 
· You have symptoms of a heart attack. These may include: ¨ Chest pain or pressure, or a strange feeling in the chest. 
¨ Sweating. ¨ Shortness of breath. ¨ Nausea or vomiting. ¨ Pain, pressure, or a strange feeling in the back, neck, jaw, or upper belly or in one or both shoulders or arms. ¨ Lightheadedness or sudden weakness. ¨ A fast or irregular heartbeat. After you call 911, the  may tell you to chew 1 adult-strength or 2 to 4 low-dose aspirin. Wait for an ambulance. Do not try to drive yourself. · You passed out (lost consciousness). Call your doctor now or seek immediate medical care if: 
· You have new or increased shortness of breath. · You are dizzy or lightheaded, or you feel like you may faint. · Your fatigue and weakness continue or get worse. · You have any abnormal bleeding, such as: 
¨ Nosebleeds. ¨ Vaginal bleeding that is different (heavier, more frequent, at a different time of the month) than what you are used to. ¨ Bloody or black stools, or rectal bleeding. ¨ Bloody or pink urine. Watch closely for changes in your health, and be sure to contact your doctor if: 
· You do not get better as expected. Where can you learn more? Go to http://masoud-daryl.info/. Enter R301 in the search box to learn more about \"Anemia: Care Instructions. \" Current as of: October 13, 2016 Content Version: 11.2 © 1176-9635 Herrenschmiede. Care instructions adapted under license by [employer] (which disclaims liability or warranty for this information).  If you have questions about a medical condition or this instruction, always ask your healthcare professional. [place] [address] any warranty or liability for your use of this information. Discharge Instructions Attachments/References PULMONARY EMBOLISM (ENGLISH) Discharge Orders None Introducing [facility]! Jaquan Rendon introduces QuikCycle patient portal. Now you can access parts of your medical record, email your doctor's office, and request medication refills online. 1. In your internet browser, go to [email or web address] 2. Click on the First Time User? Click Here link in the Sign In box. You will see the New Member Sign Up page. 3. Enter your QuikCycle Access Code exactly as it appears below. You will not need to use this code after youve completed the sign-up process. If you do not sign up before the expiration date, you must request a new code. · QuikCycle Access Code: [email or web address] Expires: 7/2/2017  2:38 PM 
 
4. Enter the last four digits of your Social Security Number (xxxx) and Date of Birth (mm/dd/yyyy) as indicated and click Submit. You will be taken to the next sign-up page. 5. Create a QuikCycle ID. This will be your QuikCycle login ID and cannot be changed, so think of one that is secure and easy to remember. 6. Create a QuikCycle password. You can change your password at any time. 7. Enter your Password Reset Question and Answer. This can be used at a later time if you forget your password. 8. Enter your e-mail address. You will receive e-mail notification when new information is available in [address] 9. Click Sign Up. You can now view and download portions of your medical record. 10. Click the Download Summary menu link to download a portable copy of your medical information. If you have questions, please visit the Frequently Asked Questions section of the QuikCycle website. Remember, QuikCycle is NOT to be used for urgent needs. For medical emergencies, dial 911. Now available from your iPhone and Android! General Information Please provide this summary of care documentation to your next provider. Patient Signature:  ____________________________________________________________ Date:  ____________________________________________________________  
  
Nahid Has Provider Signature:  ____________________________________________________________ Date:  ____________________________________________________________ More Information Pulmonary Embolism: Care Instructions Your Care Instructions Pulmonary embolism is the sudden blockage of an artery in the lung. Blood clots in the deep veins of the leg or pelvis (deep vein thrombosis, or DVT) are the most common cause. These blood clots can travel to the lungs. Pulmonary embolism can be very serious. Because you have had one pulmonary embolism, you are at greater risk for having another one. But you can take steps to prevent another pulmonary embolism by following your doctor's instructions. You will probably take a prescription blood-thinning medicine to prevent blood clots. A blood thinner can stop a blood clot from growing larger and prevent new clots from forming. Follow-up care is a key part of your treatment and safety. Be sure to make and go to all appointments, and call your doctor if you are having problems. It's also a good idea to know your test results and keep a list of the medicines you take. How can you care for yourself at home? · Take your medicines exactly as prescribed. Call your doctor if you think you are having a problem with your medicine. You will get more details on the specific medicines your doctor prescribes. · If you are taking a blood thinner, be sure you get instructions about how to take your medicine safely. Blood thinners can cause serious bleeding problems. Preventing future pulmonary embolisms · Exercise. Keep blood moving in your legs to keep clots from forming. If you are traveling by car, stop every hour or so. Get out and walk around for a few minutes. If you are traveling by bus, train, or plane, get out of your seat and walk up and down the aisles every hour or so. You also can do leg exercises while you are seated. Pump your feet up and down by pulling your toes up toward your knees then pointing them down. · Get up out of bed as soon as possible after an illness or surgery. · Do not smoke. If you need help quitting, talk to your doctor about stop-smoking programs and medicines. These can increase your chances of quitting for good. · Check with your doctor before taking hormone or birth control pills. These may increase your risk of blot clots. · Ask your doctor about wearing compression stockings to help prevent blood clots in your legs. You can buy these with a prescription at medical supply stores and some drugstores. When should you call for help? Call 911 anytime you think you may need emergency care. For example, call if: 
· You have shortness of breath. · You have chest pain. · You passed out (lost consciousness). · You cough up blood. Call your doctor now or seek immediate medical care if: 
· You have new or worsening pain or swelling in your leg. Watch closely for changes in your health, and be sure to contact your doctor if: 
· You do not get better as expected. Where can you learn more? Go to http://masoud-adryl.info/. Enter I210 in the search box to learn more about \"Pulmonary Embolism: Care Instructions. \" Current as of: October 13, 2016 Content Version: 11.2 © 0901-3606 [employer]. Care instructions adapted under license by [employer] (TTN) (which disclaims liability or warranty for this information).  If you have questions about a medical condition or this instruction, always ask your healthcare professional. [employer] disclaims any warranty or liability for your use of this information.

## 2017-04-04 ENCOUNTER — ANESTHESIA EVENT (OUTPATIENT)
Dept: ENDOSCOPY | Age: 42
DRG: 811 | End: 2017-04-04
Payer: SELF-PAY

## 2017-04-04 ENCOUNTER — ANESTHESIA (OUTPATIENT)
Dept: ENDOSCOPY | Age: 42
DRG: 811 | End: 2017-04-04
Payer: SELF-PAY

## 2017-04-04 LAB
APTT PPP: 40.9 SEC (ref 23.5–31.7)
APTT PPP: 56.1 SEC (ref 23.5–31.7)
APTT PPP: 68.1 SEC (ref 23.5–31.7)
HCT VFR BLD AUTO: 29 % (ref 41.1–50.3)
HCT VFR BLD AUTO: 29.9 % (ref 41.1–50.3)
HGB BLD-MCNC: 8 G/DL (ref 13.6–17.2)
HGB BLD-MCNC: 8.2 G/DL (ref 13.6–17.2)

## 2017-04-04 PROCEDURE — C9113 INJ PANTOPRAZOLE SODIUM, VIA: HCPCS

## 2017-04-04 PROCEDURE — 74011250636 HC RX REV CODE- 250/636

## 2017-04-04 PROCEDURE — 74011250636 HC RX REV CODE- 250/636: Performed by: INTERNAL MEDICINE

## 2017-04-04 PROCEDURE — 74011000250 HC RX REV CODE- 250: Performed by: ANESTHESIOLOGY

## 2017-04-04 PROCEDURE — 85730 THROMBOPLASTIN TIME PARTIAL: CPT | Performed by: INTERNAL MEDICINE

## 2017-04-04 PROCEDURE — 0DJ08ZZ INSPECTION OF UPPER INTESTINAL TRACT, VIA NATURAL OR ARTIFICIAL OPENING ENDOSCOPIC: ICD-10-PCS | Performed by: INTERNAL MEDICINE

## 2017-04-04 PROCEDURE — 74011000250 HC RX REV CODE- 250

## 2017-04-04 PROCEDURE — 36430 TRANSFUSION BLD/BLD COMPNT: CPT

## 2017-04-04 PROCEDURE — 36415 COLL VENOUS BLD VENIPUNCTURE: CPT | Performed by: FAMILY MEDICINE

## 2017-04-04 PROCEDURE — 65660000000 HC RM CCU STEPDOWN

## 2017-04-04 PROCEDURE — P9016 RBC LEUKOCYTES REDUCED: HCPCS | Performed by: INTERNAL MEDICINE

## 2017-04-04 PROCEDURE — 74011250637 HC RX REV CODE- 250/637: Performed by: PHYSICIAN ASSISTANT

## 2017-04-04 PROCEDURE — 76040000025: Performed by: INTERNAL MEDICINE

## 2017-04-04 PROCEDURE — 85730 THROMBOPLASTIN TIME PARTIAL: CPT | Performed by: FAMILY MEDICINE

## 2017-04-04 PROCEDURE — 76060000031 HC ANESTHESIA FIRST 0.5 HR: Performed by: INTERNAL MEDICINE

## 2017-04-04 PROCEDURE — 85018 HEMOGLOBIN: CPT | Performed by: ANESTHESIOLOGY

## 2017-04-04 PROCEDURE — 74011250636 HC RX REV CODE- 250/636: Performed by: ANESTHESIOLOGY

## 2017-04-04 RX ORDER — BISACODYL 5 MG
20 TABLET, DELAYED RELEASE (ENTERIC COATED) ORAL
Status: COMPLETED | OUTPATIENT
Start: 2017-04-04 | End: 2017-04-04

## 2017-04-04 RX ORDER — FAMOTIDINE 20 MG/1
20 TABLET, FILM COATED ORAL AS NEEDED
Status: DISCONTINUED | OUTPATIENT
Start: 2017-04-04 | End: 2017-04-06 | Stop reason: HOSPADM

## 2017-04-04 RX ORDER — PROPOFOL 10 MG/ML
INJECTION, EMULSION INTRAVENOUS AS NEEDED
Status: DISCONTINUED | OUTPATIENT
Start: 2017-04-04 | End: 2017-04-04 | Stop reason: HOSPADM

## 2017-04-04 RX ORDER — LIDOCAINE HYDROCHLORIDE 20 MG/ML
INJECTION, SOLUTION EPIDURAL; INFILTRATION; INTRACAUDAL; PERINEURAL AS NEEDED
Status: DISCONTINUED | OUTPATIENT
Start: 2017-04-04 | End: 2017-04-04 | Stop reason: HOSPADM

## 2017-04-04 RX ORDER — POLYETHYLENE GLYCOL 3350 17 G/17G
255 POWDER, FOR SOLUTION ORAL ONCE
Status: COMPLETED | OUTPATIENT
Start: 2017-04-04 | End: 2017-04-04

## 2017-04-04 RX ORDER — HEPARIN SODIUM 10000 [USP'U]/100ML
18-36 INJECTION, SOLUTION INTRAVENOUS
Status: DISCONTINUED | OUTPATIENT
Start: 2017-04-04 | End: 2017-04-04 | Stop reason: CLARIF

## 2017-04-04 RX ORDER — HEPARIN SODIUM 5000 [USP'U]/ML
60 INJECTION, SOLUTION INTRAVENOUS; SUBCUTANEOUS ONCE
Status: DISCONTINUED | OUTPATIENT
Start: 2017-04-04 | End: 2017-04-04 | Stop reason: SDUPTHER

## 2017-04-04 RX ORDER — SODIUM CHLORIDE 9 MG/ML
250 INJECTION, SOLUTION INTRAVENOUS AS NEEDED
Status: DISCONTINUED | OUTPATIENT
Start: 2017-04-04 | End: 2017-04-06 | Stop reason: HOSPADM

## 2017-04-04 RX ORDER — HEPARIN SODIUM 5000 [USP'U]/ML
35 INJECTION, SOLUTION INTRAVENOUS; SUBCUTANEOUS ONCE
Status: COMPLETED | OUTPATIENT
Start: 2017-04-04 | End: 2017-04-04

## 2017-04-04 RX ORDER — HEPARIN SODIUM 5000 [USP'U]/100ML
18-36 INJECTION, SOLUTION INTRAVENOUS
Status: DISCONTINUED | OUTPATIENT
Start: 2017-04-04 | End: 2017-04-06

## 2017-04-04 RX ORDER — FAMOTIDINE 10 MG/ML
20 INJECTION INTRAVENOUS
Status: COMPLETED | OUTPATIENT
Start: 2017-04-04 | End: 2017-04-04

## 2017-04-04 RX ORDER — SODIUM CHLORIDE 0.9 % (FLUSH) 0.9 %
5-10 SYRINGE (ML) INJECTION AS NEEDED
Status: CANCELLED | OUTPATIENT
Start: 2017-04-04

## 2017-04-04 RX ORDER — SODIUM CHLORIDE, SODIUM LACTATE, POTASSIUM CHLORIDE, CALCIUM CHLORIDE 600; 310; 30; 20 MG/100ML; MG/100ML; MG/100ML; MG/100ML
100 INJECTION, SOLUTION INTRAVENOUS CONTINUOUS
Status: CANCELLED | OUTPATIENT
Start: 2017-04-04

## 2017-04-04 RX ORDER — SODIUM CHLORIDE 9 MG/ML
100 INJECTION, SOLUTION INTRAVENOUS CONTINUOUS
Status: DISCONTINUED | OUTPATIENT
Start: 2017-04-04 | End: 2017-04-06 | Stop reason: HOSPADM

## 2017-04-04 RX ORDER — SODIUM CHLORIDE, SODIUM LACTATE, POTASSIUM CHLORIDE, CALCIUM CHLORIDE 600; 310; 30; 20 MG/100ML; MG/100ML; MG/100ML; MG/100ML
100 INJECTION, SOLUTION INTRAVENOUS CONTINUOUS
Status: DISCONTINUED | OUTPATIENT
Start: 2017-04-04 | End: 2017-04-06 | Stop reason: HOSPADM

## 2017-04-04 RX ADMIN — SODIUM CHLORIDE, SODIUM LACTATE, POTASSIUM CHLORIDE, AND CALCIUM CHLORIDE 100 ML/HR: 600; 310; 30; 20 INJECTION, SOLUTION INTRAVENOUS at 13:52

## 2017-04-04 RX ADMIN — BISACODYL 20 MG: 5 TABLET, COATED ORAL at 17:33

## 2017-04-04 RX ADMIN — SODIUM CHLORIDE 40 MG: 9 INJECTION INTRAMUSCULAR; INTRAVENOUS; SUBCUTANEOUS at 21:31

## 2017-04-04 RX ADMIN — SODIUM CHLORIDE 40 MG: 9 INJECTION INTRAMUSCULAR; INTRAVENOUS; SUBCUTANEOUS at 10:26

## 2017-04-04 RX ADMIN — SODIUM CHLORIDE 100 ML/HR: 900 INJECTION, SOLUTION INTRAVENOUS at 12:15

## 2017-04-04 RX ADMIN — HEPARIN SODIUM 4050 UNITS: 5000 INJECTION, SOLUTION INTRAVENOUS; SUBCUTANEOUS at 17:32

## 2017-04-04 RX ADMIN — PROPOFOL 50 MG: 10 INJECTION, EMULSION INTRAVENOUS at 14:17

## 2017-04-04 RX ADMIN — PROPOFOL 50 MG: 10 INJECTION, EMULSION INTRAVENOUS at 14:15

## 2017-04-04 RX ADMIN — POLYETHYLENE GLYCOL 3350 255 G: 17 POWDER, FOR SOLUTION ORAL at 20:00

## 2017-04-04 RX ADMIN — LIDOCAINE HYDROCHLORIDE 40 MG: 20 INJECTION, SOLUTION EPIDURAL; INFILTRATION; INTRACAUDAL; PERINEURAL at 14:15

## 2017-04-04 RX ADMIN — FAMOTIDINE 20 MG: 10 INJECTION, SOLUTION INTRAVENOUS at 13:54

## 2017-04-04 RX ADMIN — PROPOFOL 50 MG: 10 INJECTION, EMULSION INTRAVENOUS at 14:16

## 2017-04-04 NOTE — PROGRESS NOTES
Patient awaiting transport. EGD planned for 1430. TRANSFER - OUT REPORT:    Verbal report given to Pascual Nuñez RN(name) on [facility]  being transferred to GI Lab(unit) for routine progression of care       Report consisted of patients Situation, Background, Assessment and   Recommendations(SBAR). Information from the following report(s) SBAR and Recent Results was reviewed with the receiving nurse. Lines:   Peripheral IV 04/03/17 Right Antecubital (Active)   Site Assessment Clean, dry, & intact 4/4/2017 10:28 AM   Phlebitis Assessment 0 4/4/2017 10:28 AM   Infiltration Assessment 0 4/4/2017 10:28 AM   Dressing Status Clean, dry, & intact 4/4/2017 10:28 AM   Dressing Type Tape;Transparent 4/4/2017 10:28 AM   Hub Color/Line Status Infusing 4/4/2017 10:28 AM       Peripheral IV  Left Hand (Active)   Site Assessment Clean, dry, & intact 4/4/2017 10:28 AM   Phlebitis Assessment 0 4/4/2017 10:28 AM   Infiltration Assessment 0 4/4/2017 10:28 AM   Dressing Status Clean, dry, & intact 4/4/2017 10:28 AM   Dressing Type Tape;Transparent 4/4/2017 10:28 AM   Hub Color/Line Status Flushed;Patent 4/4/2017 10:28 AM        Opportunity for questions and clarification was provided.

## 2017-04-04 NOTE — INTERVAL H&P NOTE
H&P Update:  Mckenna [facility] was seen and examined.   He remains appropriate for endoscopy    Signed By: Chu Franco MD     April 4, 2017 1:59 PM

## 2017-04-04 NOTE — ANESTHESIA POSTPROCEDURE EVALUATION
Post-Anesthesia Evaluation and Assessment    Patient: Ruma Tavarez MRN: [id number]  SSN: [id number]    YOB: 1975  Age: 38 y.o. Sex: male       Cardiovascular Function/Vital Signs  Visit Vitals    BP 98/54    Pulse 73    Temp 37 °C (98.6 °F)    Resp 16    SpO2 95%       Patient is status post total IV anesthesia anesthesia for Procedure(s):  ESOPHAGOGASTRODUODENOSCOPY (EGD). Nausea/Vomiting: None    Postoperative hydration reviewed and adequate. Pain:  Pain Scale 1: Numeric (0 - 10) (04/04/17 7983)  Pain Intensity 1: 0 (04/04/17 1633)   Managed    Neurological Status: At baseline    Mental Status and Level of Consciousness: Arousable    Pulmonary Status:   O2 Device: Oxygen mask (04/04/17 5113)   Adequate oxygenation and airway patent    Complications related to anesthesia: None    Post-anesthesia assessment completed.  No concerns    Signed By: Fawn Bain MD     April 4, 2017

## 2017-04-04 NOTE — PROGRESS NOTES
Scanned 2nd unit of PRBC with second nurse Anabelle Camilo RN. [facility] stating this unit of blood does not match the order for the patient. Attempted to reenter order. Same message appearing. Reported to blood bank. Unit not given to patient and returned to blood bank. Reported to [facility] help line. Blood bank states they will contact floor when blood is ready. EGD planned for 1430. Patient and spouse updated. Questions answered regarding plan of care. Lab to draw PTT at this time while no blood is infusing.

## 2017-04-04 NOTE — ROUTINE PROCESS
TRANSFER - OUT REPORT:    Verbal report given to Luisa Mayorga RN on Naveed Law  being transferred to [address] [place] [address] for routine progression of care. Report consisted of patients Situation, Background, Assessment and   Recommendations(SBAR). Information from the following report(s) SBAR and Procedure Summary was reviewed with the receiving nurse. Lines:   Peripheral IV 04/03/17 Right Antecubital (Active)   Site Assessment Clean, dry, & intact 4/4/2017 10:28 AM   Phlebitis Assessment 0 4/4/2017 10:28 AM   Infiltration Assessment 0 4/4/2017 10:28 AM   Dressing Status Clean, dry, & intact 4/4/2017 10:28 AM   Dressing Type Tape;Transparent 4/4/2017 10:28 AM   Hub Color/Line Status Infusing 4/4/2017 10:28 AM       Peripheral IV  Left Hand (Active)   Site Assessment Clean, dry, & intact 4/4/2017 10:28 AM   Phlebitis Assessment 0 4/4/2017 10:28 AM   Infiltration Assessment 0 4/4/2017 10:28 AM   Dressing Status Clean, dry, & intact 4/4/2017 10:28 AM   Dressing Type Tape;Transparent 4/4/2017 10:28 AM   Hub Color/Line Status Flushed;Patent 4/4/2017 10:28 AM        Opportunity for questions and clarification was provided.       Patient transported with:   [employer]

## 2017-04-04 NOTE — CONSULTS
[facility] Consult Note    JANICE Adamson 1975       Primary GI Physician: new    Referring Physician:  Dr Nerissa Pérez    Consult Date:  4/3/2017    Admit Date:  4/3/2017    Chief Complaint:  Microcytic anemia    Subjective:     History of Present Illness:  Patient is a 38 y.o. male seen in consultation at the request of Dr. Nerissa Pérez for evaluation of microcytic anemia. He presented to the ER at [facility] today for cough and shortness of breath, reporting a progressive fatigue over the last few months. He was noted to have a hgb 6.6 with iron deficiency. Because of persistent cough and some lower extremity pain in recent days, d-dimer was obtained and was elevated. He was then noted to have subsequent findings of doppler study of LLE DVT. He notes trip to [place] in early February but no other long trips; he is active at work. Subsequent CT scan was obtained this evening and revealed evidence of multiple small pulmonary emboli as well as a moderate sized hiatal hernia. He was noted heme negative in the ER. He denies n/v or recurrent heartburn and has had no abdominal pain. Bowel pattern is daily with no rectal bleeding or melena. Weight has been stable. He admits to use of ibuprofen at least 4 times a week, typically for headache. He has no prior hx of ulcer disease or DVT/PE. He denies family hx of gi disease or clotting disorders. CT CHEST, ABDOMEN AND PELVIS WITH CONTRAST 4/3/2017   INDICATION: Symptomatic anemia. Shortness of breath.    COMPARISON: None.    TECHNIQUE: 2.5 mm axial scans from the lung apices to the diaphragm followed  by 5 mm scans from the diaphragm to the pubic symphysis following 100cc  intravenous contrast without acute complication. Intravenous contrast was given  to increase the sensitivity to inflammation, neoplasia and pulmonary embolism. Radiation dose reduction techniques were used for this study.  Our CT scanners  use one or more of the following: Automated exposure control, adjustment of the  mA and or kV according to patient size, iterative reconstruction.   Findings:   CHEST: There are bilateral intraluminal filling defects in both lower lobes and  right middle lobe. There may be small left upper lobe filling defects. Right  ventricle appears normal size. Lungs are clear. Heart size normal. No grossly  enlarged axillary, hilar or mediastinal lymph nodes. Moderate size hiatal  hernia.    ABDOMEN: Moderate sized hiatal hernia. No gross focal parenchymal abnormality  identified within the liver or spleen. No calcified gallstones. The biliary tree  is not dilated. The pancreas is unremarkable. No free fluid, acute inflammatory  changes or adenopathy. Bowel loops are not dilated. The kidneys enhance  uniformly. No radiopaque renal calculi. No hydronephrosis. The adrenal glands  are normal size. Aorta is normal caliber.   PELVIS: No free fluid or acute inflammatory changes. No adenopathy. Prostate  and seminal vesicles are unremarkable. No gross bony lesions except probable  bilateral L5 spondylolysis. The urinary bladder is unremarkable.    IMPRESSION  IMPRESSION:   1) Multiple small bilateral pulmonary emboli. Negative for evidence of right  ventricular strain. 2) Moderate sized hiatal hernia. PMH:  Past Medical History:   Diagnosis Date    Ill-defined condition     Heart murmur        PSH:  Past Surgical History:   Procedure Laterality Date    HX ORTHOPAEDIC      right foot        Allergies:  No Known Allergies    Home Medications:  Prior to Admission medications    Not on [facility] Medications:  No current facility-administered medications for this encounter.         Social History:  Social History   Substance Use Topics    Smoking status: Never Smoker    Smokeless tobacco: Not on file    Alcohol use No           Family History:  Family History   Problem Relation Age of Onset    Lupus Mother        Review of Systems:  A detailed 10 system ROS is obtained, with pertinent positives as listed above. All others are negative. Diet:  regular    Objective:     Physical Exam:  Vitals:  Visit Vitals    /41    Pulse (!) 120    Temp 98.5 °F (36.9 °C)    Resp 18    SpO2 97%     Gen:  Pt is alert, cooperative, no acute distress  Skin:  Extremities and face reveal no rashes. HEENT: Sclerae anicteric. Extra-occular muscles are intact. No oral ulcers. No abnormal pigmentation of the lips. The neck is supple. Cardiovascular: Tachycardic rate and rhythm. No murmurs, gallops, or rubs. Respiratory:  Comfortable breathing with no accessory muscle use. Clear breath sounds anteriorly with no wheezes, rales, or rhonchi. GI:  Abdomen nondistended, soft, and nontender. Normal active bowel sounds. No enlargement of the liver or spleen. No masses palpable. Rectal:  Deferred  Musculoskeletal:  No pitting edema of the lower legs. Neurological:  Gross memory appears intact. Patient is alert and oriented. Psychiatric:  Mood appears appropriate with judgement intact. Lymphatic:  No cervical or supraclavicular adenopathy. Laboratory:    Recent Labs      04/03/17   1540   WBC  9.8   HGB  6.6*   HCT  26.3*   PLT  378   MCV  58.6*   NA  137   K  3.6   CL  103   CO2  28   BUN  17   CREA  1.17   CA  9.0   GLU  103*   AP  77   SGOT  14*   ALT  23   TBILI  0.5   CBIL  0.1   ALB  3.8   TP  7.5   PTP  11.7   INR  1.1          Assessment:     Active Problems:    *Iron deficient anemia     DVT    PE    Fatigue    Plan:     37 yo male is seen in consultation for iron deficient anemia, presenting to the [facility] today with fatigue, shortness of breath and 2 month hx of cough, noted to have hgb 6.6 with ferritin 5 and serum iron of 14. Because of recent lower extremity discomfort, doppler study of BLE was obtained and revealed LLE DVT. CT scan then revealed evidence of multiple small bilateral PE's. Two units of PRBC have been ordered for transfusion.   He has been placed on a heparin infusion and consult to IR has been placed for possible filter placement. He notes a plane trip to [place] in February but otherwise denies risks of DVT/PE. Use of frequent ibuprofen is noted but patient denies gi symptoms and was heme negative in the ER. Because of the clots and probable long term need for anti-coagulation, GI evaluation is requested. There is also concern for malignancy, given the severity of anemia and findings of DVT/PE's.    1.  GI prophylaxis with Protonix  2. NPO after midnight  3. Plan EGD on Tuesday in evaluation of anemia; pending findings, he may need colonoscopy as well  4. Risks of procedure are reviewed and patient agrees to proceed  5. Further recommendations to be made based on findings of above      Patient is seen and examined in collaboration with Dr. Renetta Castro. Assessment and plan as per Dr. Renetta Castro. Theodora Beltrán NP    GI--addendum-patient seen and examined. Agree with above. Plan EGD tomorrow. Will hold Heparin in the AM after the time of the EGD is determined.   Thanks Hermila Hwang MD

## 2017-04-04 NOTE — PROGRESS NOTES
TRANSFER - IN REPORT:    Verbal report received from Bhaskar Odom RN on Ute Zuri  being received from [facility] for ordered procedure      Report consisted of patients Situation, Background, Assessment and   Recommendations(SBAR). Information from the following report(s) SBAR, Recent Results and Med Rec Status was reviewed with the receiving nurse. Opportunity for questions and clarification was provided. Assessment completed upon patients arrival to unit and care assumed.

## 2017-04-04 NOTE — H&P (VIEW-ONLY)
[facility] Consult Note    Ankur Hudson,  1975       Primary GI Physician: new    Referring Physician:  Dr Cole Mosquera    Consult Date:  4/3/2017    Admit Date:  4/3/2017    Chief Complaint:  Microcytic anemia    Subjective:     History of Present Illness:  Patient is a 38 y.o. male seen in consultation at the request of Dr. Cole Mosquera for evaluation of microcytic anemia. He presented to the ER at [facility] today for cough and shortness of breath, reporting a progressive fatigue over the last few months. He was noted to have a hgb 6.6 with iron deficiency. Because of persistent cough and some lower extremity pain in recent days, d-dimer was obtained and was elevated. He was then noted to have subsequent findings of doppler study of LLE DVT. He notes trip to [place] in early February but no other long trips; he is active at work. Subsequent CT scan was obtained this evening and revealed evidence of multiple small pulmonary emboli as well as a moderate sized hiatal hernia. He was noted heme negative in the ER. He denies n/v or recurrent heartburn and has had no abdominal pain. Bowel pattern is daily with no rectal bleeding or melena. Weight has been stable. He admits to use of ibuprofen at least 4 times a week, typically for headache. He has no prior hx of ulcer disease or DVT/PE. He denies family hx of gi disease or clotting disorders. CT CHEST, ABDOMEN AND PELVIS WITH CONTRAST 4/3/2017   INDICATION: Symptomatic anemia. Shortness of breath.    COMPARISON: None.    TECHNIQUE: 2.5 mm axial scans from the lung apices to the diaphragm followed  by 5 mm scans from the diaphragm to the pubic symphysis following 100cc  intravenous contrast without acute complication. Intravenous contrast was given  to increase the sensitivity to inflammation, neoplasia and pulmonary embolism. Radiation dose reduction techniques were used for this study.  Our CT scanners  use one or more of the following: Automated exposure control, adjustment of the  mA and or kV according to patient size, iterative reconstruction.   Findings:   CHEST: There are bilateral intraluminal filling defects in both lower lobes and  right middle lobe. There may be small left upper lobe filling defects. Right  ventricle appears normal size. Lungs are clear. Heart size normal. No grossly  enlarged axillary, hilar or mediastinal lymph nodes. Moderate size hiatal  hernia.    ABDOMEN: Moderate sized hiatal hernia. No gross focal parenchymal abnormality  identified within the liver or spleen. No calcified gallstones. The biliary tree  is not dilated. The pancreas is unremarkable. No free fluid, acute inflammatory  changes or adenopathy. Bowel loops are not dilated. The kidneys enhance  uniformly. No radiopaque renal calculi. No hydronephrosis. The adrenal glands  are normal size. Aorta is normal caliber.   PELVIS: No free fluid or acute inflammatory changes. No adenopathy. Prostate  and seminal vesicles are unremarkable. No gross bony lesions except probable  bilateral L5 spondylolysis. The urinary bladder is unremarkable.    IMPRESSION  IMPRESSION:   1) Multiple small bilateral pulmonary emboli. Negative for evidence of right  ventricular strain. 2) Moderate sized hiatal hernia. PMH:  Past Medical History:   Diagnosis Date    Ill-defined condition     Heart murmur        PSH:  Past Surgical History:   Procedure Laterality Date    HX ORTHOPAEDIC      right foot        Allergies:  No Known Allergies    Home Medications:  Prior to Admission medications    Not on [facility] Medications:  No current facility-administered medications for this encounter.         Social History:  Social History   Substance Use Topics    Smoking status: Never Smoker    Smokeless tobacco: Not on file    Alcohol use No           Family History:  Family History   Problem Relation Age of Onset    Lupus Mother        Review of Systems:  A detailed 10 system ROS is obtained, with pertinent positives as listed above. All others are negative. Diet:  regular    Objective:     Physical Exam:  Vitals:  Visit Vitals    /41    Pulse (!) 120    Temp 98.5 °F (36.9 °C)    Resp 18    SpO2 97%     Gen:  Pt is alert, cooperative, no acute distress  Skin:  Extremities and face reveal no rashes. HEENT: Sclerae anicteric. Extra-occular muscles are intact. No oral ulcers. No abnormal pigmentation of the lips. The neck is supple. Cardiovascular: Tachycardic rate and rhythm. No murmurs, gallops, or rubs. Respiratory:  Comfortable breathing with no accessory muscle use. Clear breath sounds anteriorly with no wheezes, rales, or rhonchi. GI:  Abdomen nondistended, soft, and nontender. Normal active bowel sounds. No enlargement of the liver or spleen. No masses palpable. Rectal:  Deferred  Musculoskeletal:  No pitting edema of the lower legs. Neurological:  Gross memory appears intact. Patient is alert and oriented. Psychiatric:  Mood appears appropriate with judgement intact. Lymphatic:  No cervical or supraclavicular adenopathy. Laboratory:    Recent Labs      04/03/17   1540   WBC  9.8   HGB  6.6*   HCT  26.3*   PLT  378   MCV  58.6*   NA  137   K  3.6   CL  103   CO2  28   BUN  17   CREA  1.17   CA  9.0   GLU  103*   AP  77   SGOT  14*   ALT  23   TBILI  0.5   CBIL  0.1   ALB  3.8   TP  7.5   PTP  11.7   INR  1.1          Assessment:     Active Problems:    *Iron deficient anemia     DVT    PE    Fatigue    Plan:     37 yo male is seen in consultation for iron deficient anemia, presenting to the [facility] today with fatigue, shortness of breath and 2 month hx of cough, noted to have hgb 6.6 with ferritin 5 and serum iron of 14. Because of recent lower extremity discomfort, doppler study of BLE was obtained and revealed LLE DVT. CT scan then revealed evidence of multiple small bilateral PE's. Two units of PRBC have been ordered for transfusion.   He has been placed on a heparin infusion and consult to IR has been placed for possible filter placement. He notes a plane trip to [facility] in February but otherwise denies risks of DVT/PE. Use of frequent ibuprofen is noted but patient denies gi symptoms and was heme negative in the ER. Because of the clots and probable long term need for anti-coagulation, GI evaluation is requested. There is also concern for malignancy, given the severity of anemia and findings of DVT/PE's.    1.  GI prophylaxis with Protonix  2. NPO after midnight  3. Plan EGD on Tuesday in evaluation of anemia; pending findings, he may need colonoscopy as well  4. Risks of procedure are reviewed and patient agrees to proceed  5. Further recommendations to be made based on findings of above      Patient is seen and examined in collaboration with Dr. Keyur Hook. Assessment and plan as per Dr. Keyur Hook. Miracle Lopez NP    GI--addendum-patient seen and examined. Agree with above. Plan EGD tomorrow. Will hold Heparin in the AM after the time of the EGD is determined.   Thanks Wade Pérez MD

## 2017-04-04 NOTE — PROGRESS NOTES
Patient with severe NING. Unless a very large PUD found on EGD today, I strongly recommend proceeding with a colonoscopy. Patient agrees with my plan.

## 2017-04-04 NOTE — PROGRESS NOTES
Blood transfusion completed and tolerated at this time. Patient has received a total of 2 units PRBCs.

## 2017-04-04 NOTE — ANESTHESIA PREPROCEDURE EVALUATION
Anesthetic History   No history of anesthetic complications            Review of Systems / Medical History  Patient summary reviewed, nursing notes reviewed and pertinent labs reviewed    Pulmonary          Shortness of breath (probably due to low hg.)         Neuro/Psych   Within defined limits           Cardiovascular                  Exercise tolerance: >4 METS     GI/Hepatic/Renal  Within defined limits             Comments: Rule out gi bleed, hg went to 6.6 and has received 2 units of blood.  Endo/Other        Obesity     Other Findings              Physical Exam    Airway  Mallampati: II  TM Distance: 4 - 6 cm  Neck ROM: normal range of motion   Mouth opening: Diminished (comment)     Cardiovascular    Rhythm: regular  Rate: normal    Murmur: Grade 2, Mitral area     Dental  No notable dental hx       Pulmonary  Breath sounds clear to auscultation               Abdominal         Other Findings            Anesthetic Plan    ASA: 2  Anesthesia type: total IV anesthesia          Induction: Intravenous  Anesthetic plan and risks discussed with: Patient

## 2017-04-04 NOTE — PROGRESS NOTES
TRANSFER - IN REPORT:    Verbal report received from Miguel Beatty, [facility] (name) on Dorene Poles  being received from [place] (unit) for routine progression of care      Report consisted of patients Situation, Background, Assessment and   Recommendations(SBAR). Information from the following report(s) SBAR, Kardex, ED Summary and Recent Results was reviewed with the receiving nurse. Opportunity for questions and clarification was provided. Assessment completed upon patients arrival to unit and care assumed.

## 2017-04-04 NOTE — CONSULTS
Department of Interventional Radiology  [phone]        Consult Note     Patient: Cherri Alston MRN: [id number]  SSN: [id number]    YOB: 1975  Age: 38 y.o. Sex: male      Referring Physician: TRACY Falk [employer]    Consult Date: 4/4/2017     Subjective:     Chief Complaint: DVt/PE    History of Present Illness: Cherri Alston is a 38 y.o. male who is seen in consultation for possible IVC filter. Pt presented to ED yesterday with c/o progressive dyspnea. He was found to be anemic with hgb 6.6, reported heme negative stools, bilateral small PE, nonocclusive LLE DVT. He reports LLE leg swelling and discomfort 1 week ago which resolved spontaneously. His work commute is just over an hour. He flew to [facility] mid February. He denies smoking, takes modest amount of ibuprofen weekly, no melena or epigastric discomfort. No hx DVT or malignancy. Second unit of PRBCs is infusing and he is feeling a bit better. Heparin infusion has been held for EGD.     Past Medical History:   Diagnosis Date    Ill-defined condition     Heart murmur      Past Surgical History:   Procedure Laterality Date    HX ORTHOPAEDIC      right foot       Family History   Problem Relation Age of Onset    Lupus Mother      Social History   Substance Use Topics    Smoking status: Never Smoker    Smokeless tobacco: Not on file    Alcohol use No      No Known Allergies  Current Facility-Administered Medications   Medication Dose Route Frequency    heparin 25,000 units in dextrose 500 mL infusion  18-36 Units/kg/hr IntraVENous TITRATE    0.9% sodium chloride infusion 250 mL  250 mL IntraVENous PRN    0.9% sodium chloride infusion 250 mL  250 mL IntraVENous PRN    pantoprazole (PROTONIX) 40 mg in sodium chloride 0.9 % 10 mL injection  40 mg IntraVENous Q12H    0.9% sodium chloride infusion 250 mL  250 mL IntraVENous PRN        Review of Systems:  A detailed 10 organ review of systems is obtained with pertinent positives as listed in the History of Present Illness and Past Medical History. All others are negative. Objective:     Physical Exam:  Visit Vitals    /51 (BP 1 Location: Right arm, BP Patient Position: Lying left side)    Pulse 75    Temp 97.8 °F (36.6 °C)    Resp 18    SpO2 93%      HEART: regular rate and rhythm  LUNG: clear to auscultation bilaterally  ABDOMEN: normal findings: soft, non-tender  EXTREMITIES: warm, no edema  Lab/Data Review:  BMP: No results found for: NA, K, CL, CO2, AGAP, GLU, BUN, CREA, GFRAA, GFRNA  CBC:   Lab Results   Component Value Date/Time    HGB 6.1 (LL) 04/03/2017 10:40 PM     COAGS:   Lab Results   Component Value Date/Time    APTT 56.1 (H) 04/04/2017 08:21 AM         Assessment/Plan:   LLE DVT, PE- DVT is nonocclusive, recommend resuming heparin infusion after EGD. IVC filter placement only if contraindication to anticoagulation is found. Anemia-GI work-up in progress. Consider Heme/Onc consult if no GI source discovered.      Shasta Dance, PA-C    Active Problems:    Symptomatic anemia (4/3/2017)

## 2017-04-04 NOTE — PROGRESS NOTES
Progress Note    Patient: Levar Bond MRN: [id number]  SSN: [id number]    YOB: 1975  Age: 38 y.o. Sex: male      Admit Date: 4/3/2017    LOS: 1 day     Subjective:     Seen at bedside. Admitted with DVT+ PE + severe ferropenic  Anemia. On a heparin drip. EGD today was negative. For colonoscopy tomorrow. Objective:     Vitals:    04/04/17 1438 04/04/17 1443 04/04/17 1453 04/04/17 1503   BP: 98/54 93/50 103/56 107/57   Pulse: 73 69 76 76   Resp: 16 16 16 18   Temp: 98.6 °F (37 °C)      SpO2: 95% 90% 96% 92%        Intake and Output:  Current Shift:    Last three shifts:      Physical Exam:   GENERAL: alert, cooperative, no distress, appears stated age  EYE: conjunctivae/corneas clear. PERRL, EOM's intact. Fundi benign  LYMPHATIC: Cervical, supraclavicular, and axillary nodes normal.   THROAT & NECK: normal and no erythema or exudates noted. LUNG: clear to auscultation bilaterally  HEART: regular rate and rhythm, S1, S2 normal, no murmur, click, rub or gallop  ABDOMEN: soft, non-tender. Bowel sounds normal. No masses,  no organomegaly  EXTREMITIES:  extremities normal, atraumatic, no cyanosis or edema  SKIN: Normal.  NEUROLOGIC: AOx3. Gait normal. Reflexes and motor strength normal and symmetric. Cranial nerves 2-12 and sensation grossly intact. PSYCHIATRIC: non focal    Lab/Data Review:  Lab results reviewed. For significant abnormal values and values requiring intervention, see assessment and plan.          Assessment:     Active Problems:    Symptomatic anemia (4/3/2017)        Plan:     # severe ferropenic anemia of unclear etiology   -EGD today was negative   -colonoscopy tomorrow   -Hgb level q6h     #SEVERE LLE DVT + PE   -continue heparin drip   -will decide on oral anticoagulation after colonoscopy is done         Signed By: Stephen Boo MD     April 4, 2017

## 2017-04-04 NOTE — BRIEF OP NOTE
BRIEF OPERATIVE NOTE    Date of Procedure: 4/4/2017   Preoperative Diagnosis: Iron deficiency anemia, unspecified iron deficiency anemia type [D50.9]  Postoperative Diagnosis: anemia    Procedure(s):  ESOPHAGOGASTRODUODENOSCOPY (EGD)  Surgeon(s) and Role:     * Angel Nieto MD - Primary            Surgical Staff:  Endoscopy RN-1: 2016 [facility]  Respiratory Therapist: RT Abdi  No case tracking events are documented in the log.   Anesthesia: MAC   Estimated Blood Loss: none  Specimens: * No specimens in log *   Findings: Hiatal hernia   Complications: No immediate  Implants: * No implants in log *     Plan colonoscopy tommorrow

## 2017-04-05 ENCOUNTER — ANESTHESIA EVENT (OUTPATIENT)
Dept: ENDOSCOPY | Age: 42
DRG: 811 | End: 2017-04-05
Payer: SELF-PAY

## 2017-04-05 ENCOUNTER — ANESTHESIA (OUTPATIENT)
Dept: ENDOSCOPY | Age: 42
DRG: 811 | End: 2017-04-05
Payer: SELF-PAY

## 2017-04-05 LAB
ABO + RH BLD: NORMAL
ALBUMIN SERPL BCP-MCNC: 3.2 G/DL (ref 3.5–5)
ALBUMIN/GLOB SERPL: 1 {RATIO} (ref 1.2–3.5)
ALP SERPL-CCNC: 73 U/L (ref 50–136)
ALT SERPL-CCNC: 18 U/L (ref 12–65)
ANION GAP BLD CALC-SCNC: 8 MMOL/L (ref 7–16)
APTT PPP: 25.3 SEC (ref 23.5–31.7)
APTT PPP: 56.1 SEC (ref 23.5–31.7)
APTT PPP: 84.7 SEC (ref 23.5–31.7)
AST SERPL W P-5'-P-CCNC: 13 U/L (ref 15–37)
BILIRUB SERPL-MCNC: 0.7 MG/DL (ref 0.2–1.1)
BLD PROD TYP BPU: NORMAL
BLOOD GROUP ANTIBODIES SERPL: NORMAL
BPU ID: NORMAL
BUN SERPL-MCNC: 8 MG/DL (ref 6–23)
CALCIUM SERPL-MCNC: 8.7 MG/DL (ref 8.3–10.4)
CHLORIDE SERPL-SCNC: 108 MMOL/L (ref 98–107)
CO2 SERPL-SCNC: 26 MMOL/L (ref 21–32)
CREAT SERPL-MCNC: 1 MG/DL (ref 0.8–1.5)
CROSSMATCH RESULT,%XM: NORMAL
ERYTHROCYTE [DISTWIDTH] IN BLOOD BY AUTOMATED COUNT: 22.1 % (ref 11.9–14.6)
GLOBULIN SER CALC-MCNC: 3.1 G/DL (ref 2.3–3.5)
GLUCOSE SERPL-MCNC: 85 MG/DL (ref 65–100)
HCT VFR BLD AUTO: 28.4 % (ref 41.1–50.3)
HGB BLD-MCNC: 7.6 G/DL (ref 13.6–17.2)
HGB BLD-MCNC: 8.4 G/DL (ref 13.6–17.2)
MCH RBC QN AUTO: 16.5 PG (ref 26.1–32.9)
MCHC RBC AUTO-ENTMCNC: 26.8 G/DL (ref 31.4–35)
MCV RBC AUTO: 61.6 FL (ref 79.6–97.8)
PLATELET # BLD AUTO: 384 K/UL (ref 150–450)
PMV BLD AUTO: 9.5 FL (ref 10.8–14.1)
POTASSIUM SERPL-SCNC: 3.8 MMOL/L (ref 3.5–5.1)
PROT SERPL-MCNC: 6.3 G/DL (ref 6.3–8.2)
RBC # BLD AUTO: 4.61 M/UL (ref 4.23–5.67)
SODIUM SERPL-SCNC: 142 MMOL/L (ref 136–145)
SPECIMEN EXP DATE BLD: NORMAL
STATUS OF UNIT,%ST: NORMAL
UNIT DIVISION, %UDIV: 0
WBC # BLD AUTO: 6.4 K/UL (ref 4.3–11.1)

## 2017-04-05 PROCEDURE — 36415 COLL VENOUS BLD VENIPUNCTURE: CPT | Performed by: INTERNAL MEDICINE

## 2017-04-05 PROCEDURE — 85730 THROMBOPLASTIN TIME PARTIAL: CPT | Performed by: INTERNAL MEDICINE

## 2017-04-05 PROCEDURE — 74011250636 HC RX REV CODE- 250/636

## 2017-04-05 PROCEDURE — 74011000250 HC RX REV CODE- 250

## 2017-04-05 PROCEDURE — 0DJD8ZZ INSPECTION OF LOWER INTESTINAL TRACT, VIA NATURAL OR ARTIFICIAL OPENING ENDOSCOPIC: ICD-10-PCS | Performed by: INTERNAL MEDICINE

## 2017-04-05 PROCEDURE — 74011250636 HC RX REV CODE- 250/636: Performed by: INTERNAL MEDICINE

## 2017-04-05 PROCEDURE — 74011250636 HC RX REV CODE- 250/636: Performed by: ANESTHESIOLOGY

## 2017-04-05 PROCEDURE — 80053 COMPREHEN METABOLIC PANEL: CPT | Performed by: INTERNAL MEDICINE

## 2017-04-05 PROCEDURE — 74011250636 HC RX REV CODE- 250/636: Performed by: FAMILY MEDICINE

## 2017-04-05 PROCEDURE — 65660000000 HC RM CCU STEPDOWN

## 2017-04-05 PROCEDURE — 65270000029 HC RM PRIVATE

## 2017-04-05 PROCEDURE — 76060000032 HC ANESTHESIA 0.5 TO 1 HR: Performed by: INTERNAL MEDICINE

## 2017-04-05 PROCEDURE — 85027 COMPLETE CBC AUTOMATED: CPT | Performed by: INTERNAL MEDICINE

## 2017-04-05 PROCEDURE — 82784 ASSAY IGA/IGD/IGG/IGM EACH: CPT | Performed by: INTERNAL MEDICINE

## 2017-04-05 PROCEDURE — 74011250636 HC RX REV CODE- 250/636: Performed by: NURSE PRACTITIONER

## 2017-04-05 PROCEDURE — 76040000025: Performed by: INTERNAL MEDICINE

## 2017-04-05 PROCEDURE — 85018 HEMOGLOBIN: CPT | Performed by: INTERNAL MEDICINE

## 2017-04-05 PROCEDURE — C9113 INJ PANTOPRAZOLE SODIUM, VIA: HCPCS

## 2017-04-05 PROCEDURE — 74011000258 HC RX REV CODE- 258: Performed by: NURSE PRACTITIONER

## 2017-04-05 PROCEDURE — 74011250637 HC RX REV CODE- 250/637: Performed by: NURSE PRACTITIONER

## 2017-04-05 RX ORDER — DIPHENHYDRAMINE HYDROCHLORIDE 50 MG/ML
50 INJECTION, SOLUTION INTRAMUSCULAR; INTRAVENOUS ONCE
Status: DISCONTINUED | OUTPATIENT
Start: 2017-04-05 | End: 2017-04-05

## 2017-04-05 RX ORDER — PROPOFOL 10 MG/ML
INJECTION, EMULSION INTRAVENOUS AS NEEDED
Status: DISCONTINUED | OUTPATIENT
Start: 2017-04-05 | End: 2017-04-05 | Stop reason: HOSPADM

## 2017-04-05 RX ORDER — ACETAMINOPHEN 325 MG/1
650 TABLET ORAL
Status: COMPLETED | OUTPATIENT
Start: 2017-04-05 | End: 2017-04-05

## 2017-04-05 RX ORDER — PROPOFOL 10 MG/ML
INJECTION, EMULSION INTRAVENOUS
Status: DISCONTINUED | OUTPATIENT
Start: 2017-04-05 | End: 2017-04-05 | Stop reason: HOSPADM

## 2017-04-05 RX ORDER — SODIUM CHLORIDE, SODIUM LACTATE, POTASSIUM CHLORIDE, CALCIUM CHLORIDE 600; 310; 30; 20 MG/100ML; MG/100ML; MG/100ML; MG/100ML
1000 INJECTION, SOLUTION INTRAVENOUS CONTINUOUS
Status: DISCONTINUED | OUTPATIENT
Start: 2017-04-05 | End: 2017-04-05 | Stop reason: HOSPADM

## 2017-04-05 RX ORDER — ONDANSETRON 2 MG/ML
INJECTION INTRAMUSCULAR; INTRAVENOUS AS NEEDED
Status: DISCONTINUED | OUTPATIENT
Start: 2017-04-05 | End: 2017-04-05 | Stop reason: HOSPADM

## 2017-04-05 RX ORDER — DIPHENHYDRAMINE HYDROCHLORIDE 50 MG/ML
50 INJECTION, SOLUTION INTRAMUSCULAR; INTRAVENOUS
Status: COMPLETED | OUTPATIENT
Start: 2017-04-05 | End: 2017-04-05

## 2017-04-05 RX ORDER — HEPARIN SODIUM 5000 [USP'U]/ML
4000 INJECTION, SOLUTION INTRAVENOUS; SUBCUTANEOUS ONCE
Status: COMPLETED | OUTPATIENT
Start: 2017-04-05 | End: 2017-04-05

## 2017-04-05 RX ORDER — ACETAMINOPHEN 325 MG/1
650 TABLET ORAL ONCE
Status: DISCONTINUED | OUTPATIENT
Start: 2017-04-05 | End: 2017-04-05

## 2017-04-05 RX ADMIN — IRON DEXTRAN 25 MG: 50 INJECTION INTRAMUSCULAR; INTRAVENOUS at 17:59

## 2017-04-05 RX ADMIN — PROPOFOL 50 MG: 10 INJECTION, EMULSION INTRAVENOUS at 15:33

## 2017-04-05 RX ADMIN — SODIUM CHLORIDE 100 ML/HR: 900 INJECTION, SOLUTION INTRAVENOUS at 19:51

## 2017-04-05 RX ADMIN — HEPARIN SODIUM AND DEXTROSE 18 UNITS/KG/HR: 5000; 5 INJECTION INTRAVENOUS at 21:20

## 2017-04-05 RX ADMIN — IRON DEXTRAN 1000 MG: 50 INJECTION INTRAMUSCULAR; INTRAVENOUS at 23:02

## 2017-04-05 RX ADMIN — HEPARIN SODIUM AND DEXTROSE 24 UNITS/KG/HR: 5000; 5 INJECTION INTRAVENOUS at 09:02

## 2017-04-05 RX ADMIN — SODIUM CHLORIDE 40 MG: 9 INJECTION INTRAMUSCULAR; INTRAVENOUS; SUBCUTANEOUS at 21:21

## 2017-04-05 RX ADMIN — SODIUM CHLORIDE, SODIUM LACTATE, POTASSIUM CHLORIDE, AND CALCIUM CHLORIDE 1000 ML: 600; 310; 30; 20 INJECTION, SOLUTION INTRAVENOUS at 15:19

## 2017-04-05 RX ADMIN — HEPARIN SODIUM 4000 UNITS: 5000 INJECTION, SOLUTION INTRAVENOUS; SUBCUTANEOUS at 09:01

## 2017-04-05 RX ADMIN — ONDANSETRON 4 MG: 2 INJECTION INTRAMUSCULAR; INTRAVENOUS at 15:43

## 2017-04-05 RX ADMIN — HEPARIN SODIUM AND DEXTROSE 22 UNITS/KG/HR: 5000; 5 INJECTION INTRAVENOUS at 01:32

## 2017-04-05 RX ADMIN — ACETAMINOPHEN 650 MG: 325 TABLET, FILM COATED ORAL at 21:34

## 2017-04-05 RX ADMIN — DIPHENHYDRAMINE HYDROCHLORIDE 50 MG: 50 INJECTION, SOLUTION INTRAMUSCULAR; INTRAVENOUS at 21:34

## 2017-04-05 RX ADMIN — SODIUM CHLORIDE 40 MG: 9 INJECTION INTRAMUSCULAR; INTRAVENOUS; SUBCUTANEOUS at 09:02

## 2017-04-05 RX ADMIN — PROPOFOL 300 MCG/KG/MIN: 10 INJECTION, EMULSION INTRAVENOUS at 15:33

## 2017-04-05 NOTE — PROCEDURES
Colonoscopy Procedure Note    PreOp Diagnosis:   Iron deficiency anemia  Constipation    PostOp Diagnosis:  Diverticulosis    Medications:  Monitored Anesthesia    Procedure:  Colonoscopy  Instrument:   AL  After informed consent was obtained, the patient was sedated and the colonoscope was inserted  in the anus and advanced into the cecum without difficulty. The scope was slowly withdrawn while the mucosa was carefully inspected, including a retroflexed view of the rectum. Prep: Excellent    Findings:   Ileum: Normal x 10cm  Colon: Moderate sigmoid diverticulosis. No inflammatory changes polyps or AVMs.   No source of bleeding identified  Rectum: normal    Recommendations:  Plan on outpatient capsule endoscopy for iron deficiency anemia  Avoid NSAIDs  Check celiac labs  Resume diet    Emmanuel Baltazar MD

## 2017-04-05 NOTE — CONSULTS
Inpatient Hematology / Oncology Consult Note    Reason for Consult:  Iron deficiency anemia, unspecified iron deficiency anemia type [D50.9]  Anemia, unspecified type [D64.9]  Referring Physician:  Michelle Dugan MD    History of Present Illness:  Patient is a 38 y.o. male seen in consultation  for evaluation of anemia and DVT LLE. He presented to the ER 4/3/2017 for cough and shortness of breath, reporting a progressive fatigue over the last few months. He was noted to have a hgb 6.6. He was then noted to have subsequent findings of doppler study of LLE DVT. Subsequent CT scan was obtained and revealed evidence of multiple small pulmonary emboli. He notes a trip to [address] [place] [address] for his son's baseball game 2 weeks ago which was about a 2 hour drive there. The next day after that trip, he developed soreness and swelling in LLE. He was noted heme negative in the ER. He denies n/v or recurrent heartburn and has had no abdominal pain. Bowel pattern is daily with no rectal bleeding or melena that he is aware of however, he admits that he has really never looked at his BM to note the color. Weight has been stable. He admits to use of ibuprofen daily. He has no prior hx of ulcer disease or DVT/PE. He denies family hx of gi disease or clotting disorders. Review of Systems:  Constitutional Denies fever, chills,  appetite changes, night sweats. Positive for fatigue and weight loss. HEENT Denies trauma, blurry vision, hearing loss, ear pain, nosebleeds, sore throat, neck pain    Skin Denies lesions or rashes. Lungs Denies dyspnea, cough, sputum production or hemoptysis. Cardiovascular Denies chest pain, palpitations, or lower extremity edema. Gastrointestinal Denies nausea, vomiting, changes in bowel habits, bloody or black stools, abdominal pain.  Denies dysuria, frequency or hesitancy of urination. Neuro Denies headaches, visual changes or ataxia.  Denies dizziness, tingling, tremors, sensory change, speech change, focal weakness or headaches. Hematology Denies easy bruising or bleeding, denies gingival bleeding or epistaxis. Endo Denies heat/cold intolerance, denies diabetes or thyroid abnormalities. MSK Denies back pain, arthralgias, myalgias or frequent falls. Psychiatric/Behavioral Denies depression and substance abuse. The patient is not nervous/anxious. No Known Allergies  Past Medical History:   Diagnosis Date    Ill-defined condition     Heart murmur      Past Surgical History:   Procedure Laterality Date    HX ORTHOPAEDIC      right foot      Family History   Problem Relation Age of Onset    Lupus Mother      Social History     Social History    Marital status:      Spouse name: N/A    Number of children: N/A    Years of education: N/A     Occupational History    Not on file.      Social History Main Topics    Smoking status: Never Smoker    Smokeless tobacco: Not on file    Alcohol use No    Drug use: Not on file    Sexual activity: Not on file     Other Topics Concern    Not on file     Social History Narrative     Current Facility-Administered Medications   Medication Dose Route Frequency Provider Last Rate Last Dose    heparin 25,000 units in dextrose 500 mL infusion  18-36 Units/kg/hr IntraVENous TITRATE Amelia Holly MD   Stopped at 04/05/17 1007    0.9% sodium chloride infusion 250 mL  250 mL IntraVENous PRN Akua Nation MD        lactated ringers infusion  100 mL/hr IntraVENous CONTINUOUS John Malone  mL/hr at 04/04/17 1352 100 mL/hr at 04/04/17 1352    famotidine (PEPCID) tablet 20 mg  20 mg Oral PRN John Malone MD        0.9% sodium chloride infusion  100 mL/hr IntraVENous CONTINUOUS Agustina Pino  mL/hr at 04/04/17 1215 100 mL/hr at 04/04/17 1215    pantoprazole (PROTONIX) 40 mg in sodium chloride 0.9 % 10 mL injection  40 mg IntraVENous Q12H Moreno Umanzor, PA   40 mg at 04/05/17 0902       OBJECTIVE:  Patient Vitals for the past 8 hrs:   BP Temp Pulse Resp SpO2   17 1138 106/70 97.8 °F (36.6 °C) 72 18 97 %     Temp (24hrs), Av.1 °F (36.7 °C), Min:97.6 °F (36.4 °C), Max:98.6 °F (37 °C)    701 -  1900  In: 508 [I.V.:957]  Out: -     Physical Exam:  Constitutional: Well developed, well nourished male in no acute distress, sitting comfortably in the hospital bed. Wife at bedside. HEENT: Normocephalic and atraumatic. Oropharynx is clear, mucous membranes are moist. Extraocular muscles are intact. Sclerae anicteric. Neck supple. Lymph node   No palpable submandibular, cervical, supraclavicular, axillary or inguinal lymph nodes. Skin Warm and dry. No bruising and no rash noted. No erythema. No pallor. Respiratory Lungs are clear to auscultation bilaterally without wheezes, rales or rhonchi, normal air exchange without accessory muscle use. CVS Normal rate, regular rhythm and normal S1 and S2. No murmurs, gallops, or rubs. Abdomen Soft, nontender and nondistended, normoactive bowel sounds. No palpable mass. No hepatosplenomegaly. Neuro Grossly nonfocal with no obvious sensory or motor deficits. MSK Normal range of motion in general.  No edema and no tenderness. Psych Appropriate mood and affect.         Labs:    Recent Results (from the past 24 hour(s))   HGB & HCT    Collection Time: 17  2:00 PM   Result Value Ref Range    HGB 8.2 (L) 13.6 - 17.2 g/dL    HCT 29.9 (L) 41.1 - 50.3 %   HGB & HCT    Collection Time: 17  8:27 PM   Result Value Ref Range    HGB 8.0 (L) 13.6 - 17.2 g/dL    HCT 29.0 (L) 41.1 - 50.3 %   PTT    Collection Time: 17  8:27 PM   Result Value Ref Range    aPTT 68.1 (H) 23.5 - 31.7 SEC   PTT    Collection Time: 17 12:00 AM   Result Value Ref Range    aPTT 84.7 (H) 23.5 - 31.7 SEC   HEMOGLOBIN    Collection Time: 17 12:00 AM   Result Value Ref Range    HGB 8.4 (L) 13.6 - 17.2 g/dL   PTT    Collection Time: 17  6:18 AM   Result Value Ref Range    aPTT 56.1 (H) 23.5 - 31.7 SEC   CBC W/O DIFF    Collection Time: 04/05/17  6:18 AM   Result Value Ref Range    WBC 6.4 4.3 - 11.1 K/uL    RBC 4.61 4.23 - 5.67 M/uL    HGB 7.6 (L) 13.6 - 17.2 g/dL    HCT 28.4 (L) 41.1 - 50.3 %    MCV 61.6 (L) 79.6 - 97.8 FL    MCH 16.5 (L) 26.1 - 32.9 PG    MCHC 26.8 (L) 31.4 - 35.0 g/dL    RDW 22.1 (H) 11.9 - 14.6 %    PLATELET 146 763 - 573 K/uL    MPV 9.5 (L) 10.8 - 09.6 FL   METABOLIC PANEL, COMPREHENSIVE    Collection Time: 04/05/17  6:18 AM   Result Value Ref Range    Sodium 142 136 - 145 mmol/L    Potassium 3.8 3.5 - 5.1 mmol/L    Chloride 108 (H) 98 - 107 mmol/L    CO2 26 21 - 32 mmol/L    Anion gap 8 7 - 16 mmol/L    Glucose 85 65 - 100 mg/dL    BUN 8 6 - 23 MG/DL    Creatinine 1.00 0.8 - 1.5 MG/DL    GFR est AA >60 >60 ml/min/1.73m2    GFR est non-AA >60 >60 ml/min/1.73m2    Calcium 8.7 8.3 - 10.4 MG/DL    Bilirubin, total 0.7 0.2 - 1.1 MG/DL    ALT (SGPT) 18 12 - 65 U/L    AST (SGOT) 13 (L) 15 - 37 U/L    Alk. phosphatase 73 50 - 136 U/L    Protein, total 6.3 6.3 - 8.2 g/dL    Albumin 3.2 (L) 3.5 - 5.0 g/dL    Globulin 3.1 2.3 - 3.5 g/dL    A-G Ratio 1.0 (L) 1.2 - 3.5         Imaging:  [unfilled]    ASSESSMENT:  Problem List  Never Reviewed          Codes Class Noted    Symptomatic anemia ICD-10-CM: D64.9  ICD-9-CM: 285.9  4/3/2017        Obesity ICD-10-CM: E66.9  ICD-9-CM: 278.00  4/3/2017        Deep vein thrombosis (DVT) of left lower extremity ([facility] [address] ICD-10-CM: U68.483  ICD-9-CM: 453.40  4/3/2017                RECOMMENDATIONS:    Anemia  4/5 IV Iron today. EGD wnl. Colonoscopy today. DVT/PE  4/5 Continue with heparin and bridge to Xarelto for 9-12 months prior to discharge. Follow up with Dr. Tommie Pinto once discharged-hypercoag work up in office. Lab studies and imaging studies were personally reviewed. Pertinent old records were reviewed. Thank you for allowing us to participate in the care of Mr. Jeong.             Tru Spencer NP   [facility]  [address] [facility]  [address] [facility]  Office : [phone]  Fax : [phone]         Attending Addendum:  I personally evaluated the patient with Dara Shepard N.P.,  and agree with the assessment, findings and plan as documented. Appears stable, heart regular without murmur, lungs clear, abdomen benign. 38 M, non-smoker, admits to daily ibuprofen use as needed for headaches, being consulted for anemia, as well as PE/LLE DVT. He reports noticed slowly worsening dyspnea on ecertion over last few months. Reports a short flight to [facility] 02/17. More recently 2 weeks prior to hospitalization reports undergoing a 2 hr road trip to [place] for his [employer] ball game, w subsequent return the next day. Notes following day starting having LLE swelling/pain. 1 week ago became SOB leading to evaluation: CT chest 4/3/17 revealed b/l small PE's without evidence of RT heart strain. Dopplers consistent w LLE above knee DVT. He was aslo found to be anemic (hgb 6 range, significantly low iron levels). Denies any blood per rectum but admits to not watching his stools. Denies family h/o thrombophilia. 1 sister w single early trimester miscarriage. Anemia  - Iron deficiency related. GI w/u in place. Admits to regular ibuprofen use. Will  Give IV Infed 1 gm x 1    PE/LLE above knee DVT  - No RT heart strain, sating well on RA currently  - Continue current heparin drip, once all procedures are complete, he can be switched over to rivaroxaban  - Given only short road trips prior to VTE, will check for hypercoagulable w/u in office. If unremarkable, then will consider stopping a/c in 9-12 months if D dimer normalizes and no significant post thrombotic syndrome  - compression stockings. F/u in hematology in 2-3 weeks from discharge. Thank you for allowing us to participate in care of this pleasant patient. Please call w any questions.                 Concha Villaseñor MD  [address] [facility]  [address] [place] [address]  Office : [phone]  Fax : [phone]

## 2017-04-05 NOTE — ROUTINE PROCESS
Dr. Manuel Sullivan notified about heparin drip being stopped at 0900 4-5-17. No new orders at this time.

## 2017-04-05 NOTE — MED STUDENT NOTES
*ATTENTION:  This note has been created by a medical student for educational purposes only. Please do not refer to the content of this note for clinical decision-making, billing, or other purposes. Please see attending physicians note to obtain clinical information on this patient. *       Hospitalist Service Note (Medical Student Note*)   2017  Admit Date: 4/3/2017  7:59 PM   NAME: Caesar Leung   :  1975   MRN:  [id number]   Attending: Ho Massey MD  PCP:  None  Treatment Team: Attending Provider: Ho Massey MD; Consulting Provider: Mary Yanes MD; Consulting Provider: Anaya Sen MD  SUBJECTIVE:     Reason for Admission:     Per HPI Nas Booth is a 38 y.o. male that presented to the ED with 2 week history of dyspnea. He denies associated chest pain. He also reports LLE pain and swelling several weeks ago that spontaneously resolved. 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 Work up in the ED shows Hgb 6.6 with stool reportedly heme negative and LLE DVT. He denies overt blood loss including hematemesis, coffee ground emesis, hematochezia, melena, or hematuria. He routinely takes Ibuprofen 200 mg once daily. \" Pt has received two units of RBC to raise Hb up to 8.4. EGD was done, showing no pathology. - Pt was seen lying in bed accompanied by his wife. Pt denies no CP, SOB, N/V/D, abdominal pain, or dizziness. Pt completed colonoscopy prep last night and is waiting colonoscopy today.      Social History   Substance Use Topics    Smoking status: Never Smoker    Smokeless tobacco: Not on file    Alcohol use No       Past Medical History:   Diagnosis Date    Ill-defined condition     Heart murmur        Past Surgical History:   Procedure Laterality Date    HX ORTHOPAEDIC      right foot        No Known Allergies    Current Facility-Administered Medications   Medication Dose Route Frequency Provider Last Rate Last Dose    iron dextran complex (INFED) 1,000 mg in 0.9% sodium chloride 500 mL IVPB  1,000 mg IntraVENous ONCE Dareen Laughter, NP        acetaminophen (TYLENOL) tablet 650 mg  650 mg Oral ONCE PRN Dareen Laughter, NP        diphenhydrAMINE (BENADRYL) injection 50 mg  50 mg IntraVENous ONCE PRN Dareen Laughter, NP        iron dextran complex (INFED) 25 mg in 0.9% sodium chloride 50 mL ivpb  25 mg IntraVENous ONCE Dareen Laughter, NP        heparin 25,000 units in dextrose 500 mL infusion  18-36 Units/kg/hr IntraVENous TITRATE Chelsea Brown MD   Stopped at 04/05/17 1007    0.9% sodium chloride infusion 250 mL  250 mL IntraVENous PRN Gwendolyn Fernandez MD        lactated ringers infusion  100 mL/hr IntraVENous CONTINUOUS Addi Tomlinson  mL/hr at 04/04/17 1352 100 mL/hr at 04/04/17 1352    famotidine (PEPCID) tablet 20 mg  20 mg Oral PRN Addi Tomlinson MD        0.9% sodium chloride infusion  100 mL/hr IntraVENous CONTINUOUS Evelia Alicea  mL/hr at 04/04/17 1215 100 mL/hr at 04/04/17 1215    pantoprazole (PROTONIX) 40 mg in sodium chloride 0.9 % 10 mL injection  40 mg IntraVENous Q12H JEANIE Alvarez   40 mg at 04/05/17 7816       Recent Results (from the past 24 hour(s))   HGB & HCT    Collection Time: 04/04/17  8:27 PM   Result Value Ref Range    HGB 8.0 (L) 13.6 - 17.2 g/dL    HCT 29.0 (L) 41.1 - 50.3 %   PTT    Collection Time: 04/04/17  8:27 PM   Result Value Ref Range    aPTT 68.1 (H) 23.5 - 31.7 SEC   PTT    Collection Time: 04/05/17 12:00 AM   Result Value Ref Range    aPTT 84.7 (H) 23.5 - 31.7 SEC   HEMOGLOBIN    Collection Time: 04/05/17 12:00 AM   Result Value Ref Range    HGB 8.4 (L) 13.6 - 17.2 g/dL   PTT    Collection Time: 04/05/17  6:18 AM   Result Value Ref Range    aPTT 56.1 (H) 23.5 - 31.7 SEC   CBC W/O DIFF    Collection Time: 04/05/17  6:18 AM   Result Value Ref Range    WBC 6.4 4.3 - 11.1 K/uL    RBC 4.61 4.23 - 5.67 M/uL    HGB 7.6 (L) 13.6 - 17.2 g/dL    HCT 28.4 (L) 41.1 - 50.3 %    MCV 61.6 (L) 79.6 - 97.8 FL    MCH 16.5 (L) 26.1 - 32.9 PG    MCHC 26.8 (L) 31.4 - 35.0 g/dL    RDW 22.1 (H) 11.9 - 14.6 %    PLATELET 566 718 - 628 K/uL    MPV 9.5 (L) 10.8 - 80.5 FL   METABOLIC PANEL, COMPREHENSIVE    Collection Time: 17  6:18 AM   Result Value Ref Range    Sodium 142 136 - 145 mmol/L    Potassium 3.8 3.5 - 5.1 mmol/L    Chloride 108 (H) 98 - 107 mmol/L    CO2 26 21 - 32 mmol/L    Anion gap 8 7 - 16 mmol/L    Glucose 85 65 - 100 mg/dL    BUN 8 6 - 23 MG/DL    Creatinine 1.00 0.8 - 1.5 MG/DL    GFR est AA >60 >60 ml/min/1.73m2    GFR est non-AA >60 >60 ml/min/1.73m2    Calcium 8.7 8.3 - 10.4 MG/DL    Bilirubin, total 0.7 0.2 - 1.1 MG/DL    ALT (SGPT) 18 12 - 65 U/L    AST (SGOT) 13 (L) 15 - 37 U/L    Alk. phosphatase 73 50 - 136 U/L    Protein, total 6.3 6.3 - 8.2 g/dL    Albumin 3.2 (L) 3.5 - 5.0 g/dL    Globulin 3.1 2.3 - 3.5 g/dL    A-G Ratio 1.0 (L) 1.2 - 3.5         PHYSICAL EXAM     Visit Vitals    /71    Pulse 78    Temp 98 °F (36.7 °C)    Resp 16    SpO2 94%      Temp (24hrs), Av °F (36.7 °C), Min:97.6 °F (36.4 °C), Max:98.4 °F (36.9 °C)    Oxygen Therapy  O2 Sat (%): 94 % (17 1507)  Pulse via Oximetry: 79 beats per minute (17 1507)  O2 Device: Room air (17 1507)  O2 Flow Rate (L/min): 2 l/min (17 1453)    Intake/Output Summary (Last 24 hours) at 17 1513  Last data filed at 17 3025   Gross per 24 hour   Intake             1317 ml   Output                0 ml   Net             1317 ml        General: WD and WN , No apparent distress.    Head: Normocephalic, without obvious abnormality, atraumatic. Eyes: PERRL; EOMI  ENT: Hearing is normal. Oropharynx is clear with tacky mucous membranes    Resp: Clear to auscultation bilaterally. No Wheezing or Rhonchi. Resp are even and unlabored  Heart[de-identified] Regular rate and rhythm, no murmur, rub or gallop. Abdomen: Soft, non-tender. Not distended.  Bowel sounds normal. hepato-splenomegaly-none  Extremities: No cyanosis. No clubbing. No LE edema  Skin: Texture, turgor normal. No significant rashes or lesions. Neurologic: No focal CN deficits noted  Psych: Alert and oriented x 4; Judgement and insight are normal     DIAGNOSTIC STUDIES      Labs and Studies from previous 24 hours have been personally reviewed by myself    Hb 8.4--> 7.6      ASSESSMENT / Jorje Rodriguez 169 Problems    Diagnosis Date Noted    Symptomatic anemia 04/03/2017       Severe Ferropenic Anemia of unclear etiology. - Colonoscopy today. Awaiting Results. - Consult to Heme. DVT of LLE and PE   Continue heparin drip as need.            Full Code    Signed By: LEODAN Covington-III    April 5, 2017

## 2017-04-05 NOTE — PROGRESS NOTES
TRANSFER - OUT REPORT:    Verbal report given to Efrain Foss RN on [facility]  being transferred to GI Lab for ordered procedure       Report consisted of patients Situation, Background, Assessment and   Recommendations(SBAR). Information from the following report(s) SBAR was reviewed with the receiving nurse. Lines:   Peripheral IV 04/03/17 Right Antecubital (Active)   Site Assessment Clean, dry, & intact 4/5/2017  1:43 PM   Phlebitis Assessment 0 4/5/2017  1:43 PM   Infiltration Assessment 0 4/5/2017  1:43 PM   Dressing Status Clean, dry, & intact 4/5/2017  1:43 PM   Dressing Type Tape;Transparent 4/5/2017  1:43 PM   Hub Color/Line Status Flushed 4/5/2017  1:43 PM       Peripheral IV  Left Hand (Active)   Site Assessment Clean, dry, & intact 4/5/2017  1:43 PM   Phlebitis Assessment 0 4/5/2017  1:43 PM   Infiltration Assessment 0 4/5/2017  1:43 PM   Dressing Status Clean, dry, & intact 4/5/2017  1:43 PM   Dressing Type Tape;Transparent 4/5/2017  1:43 PM   Hub Color/Line Status Infusing 4/5/2017  1:43 PM        Opportunity for questions and clarification was provided.       Patient transported with:   [employer]

## 2017-04-05 NOTE — INTERVAL H&P NOTE
H&P Update:  Dorothy England was seen and examined. History and physical has been reviewed. The patient has been examined.  There have been no significant clinical changes since the completion of the originally dated History and Physical.    Signed By: Ronald Boateng MD     April 5, 2017 3:23 PM

## 2017-04-05 NOTE — PROGRESS NOTES
TRANSFER - IN REPORT:    Verbal report received from Herman Guerra RN on [facility]  being received from GI Lab for routine progression of care      Report consisted of patients Situation, Background, Assessment and   Recommendations(SBAR). Information from the following report(s) SBAR was reviewed with the receiving nurse. Opportunity for questions and clarification was provided. Assessment completed upon patients arrival to unit and care assumed.

## 2017-04-05 NOTE — PROGRESS NOTES
Problem: Nutrition Deficit  Goal: *Optimize nutritional status  Nutrition  Reason for assessment: Referral received from nursing admission Malnutrition Screening Tool for recently lost 2-13# without trying and eating poorly due to decreased appetite. Assessment:   Diet order(s): 4-3;regular, 4-4: NPO, then clear liquid, 4-5: NPO  Food/Nutrition History:  Pt presented with 2 month hx of cough, SOB and fatigue. Findings of DVT+ PE + severe ferropenic. [facility]. Plan for colonoscopy today. Pt seen in company of wife. He reports decreased appetite and intake to ~75% of usual for the past month which he r/t to fatigue. He is hungry today and hoping to be able to eat after colonoscopy. Anthropometrics: Height 5'8\",  weight 256# (unknown source), BMI 38.9 c/w class II obesity. Stated UBW: 270#, 5.2 % change in wt within ~ 1month c/w severe change. Macronutrient needs:              EER:  4142-7094 kcal /day (15-20 kcal/kg listed BW)              EPR:   grams protein/day (1.2-1.5 grams/kg IBW)  Intake/Comparative Standards: Current NPO status does not meet kcal or protein needs      Nutrition Diagnosis: Inadequate oral intake r/t inability to consume sufficient oral intake as evidenced by diet limited to nutritionally inadequate clear liquid diet or NPO status while undergoing anemia w/u. Decreased po intake and wt loss as above PTA     Intervention:  Meals and snacks:  Await initiation and progression of p.o. diet as GI status allows. Nutrition supplement therapy: Defer until w/u is complete and po intake post procedures is established.      Nahomy Nagel, 68 [address], [facility] [address] [place] [address], [address] [place] [address]

## 2017-04-05 NOTE — PROGRESS NOTES
Pt transported back to room via stretcher by transport tech. VSS on room air. Dr. Vishal Mcmahon spoke to pt and family at bedside.

## 2017-04-05 NOTE — PROGRESS NOTES
Gastroenterology    As EGD and colonoscopy were negative for source for bleeding, we will arrange for outpatient capsule endoscopy at our office. He is receiving IV iron today. We will follow up with him as an outpatient to discuss the capsule endoscopy results. Cynthia Swain  [facility]

## 2017-04-05 NOTE — PROCEDURES
[facility]   PROCEDURE NOTE       Name:  Ladean Rubinstein   MR#:  [id number]   :  1975   Account #:  [de-identified]   Date of Adm:  2017       DATE OF PROCEDURE: 2017     PROCEDURE: Esophagogastroduodenoscopy. INDICATION: Iron-deficiency anemia. POSTOPERATIVE DIAGNOSES: Hiatal hernia, no active bleeding. ANESTHESIA: MAC.    OTHER PROCEDURES: None. SCOPE: GIF-H190. PROCEDURE NOTE: After informed consent was obtained, the patient   was placed in the left lateral decubitus position in the   endoscopy suite. Conscious sedation was obtained utilizing   monitored anesthesia. Once adequate analgesia was obtained, the   Olympus GIF-H190 video chip endoscope was passed through   oropharynx, into esophagus, stomach and small bowel under direct   visualization. The scope was advanced through the periampullary   duodenum. It was withdrawn with careful attention to mucosal   detail. The entire exam of the duodenum, pylorus, prepyloric   antrum were normal. In the body, cardia and fundus of the   stomach, there was a relatively large hiatal hernia without   erosions. I appreciated no active GI bleeding. The esophagus was   normal.    ASSESSMENT AND PLAN: Profound iron-deficiency anemia. No   evidence of upper gastrointestinal bleeding. Proceed with   colonoscopy.         MD CORRIE Marrero / Kyle Adler   D:  2017   14:33   T:  2017   00:39   Job #:  [id number]

## 2017-04-05 NOTE — PROGRESS NOTES
TRANSFER - IN REPORT:    Verbal report received from [facility], [address] (name) on Eduardo Devyn  being received from room 607 (unit) for ordered procedure. Report consisted of patients Situation, Background, Assessment and   Recommendations(SBAR). Information from the following report(s) SBAR was reviewed with the receiving nurse. Opportunity for questions and clarification was provided. Awaiting transport to bring pt to the GI Lab at this time.

## 2017-04-05 NOTE — ANESTHESIA PREPROCEDURE EVALUATION
Anesthetic History               Review of Systems / Medical History  Patient summary reviewed    Pulmonary                   Neuro/Psych              Cardiovascular                  Exercise tolerance: >4 METS     GI/Hepatic/Renal                Endo/Other        Obesity and anemia     Other Findings   Comments: DVT           Physical Exam    Airway  Mallampati: II  TM Distance: > 6 cm  Neck ROM: normal range of motion   Mouth opening: Normal     Cardiovascular  Regular rate and rhythm,  S1 and S2 normal,  no murmur, click, rub, or gallop             Dental  No notable dental hx       Pulmonary  Breath sounds clear to auscultation               Abdominal         Other Findings            Anesthetic Plan    ASA: 2  Anesthesia type: total IV anesthesia            Anesthetic plan and risks discussed with: Patient

## 2017-04-06 VITALS
DIASTOLIC BLOOD PRESSURE: 60 MMHG | TEMPERATURE: 97.6 F | OXYGEN SATURATION: 97 % | HEART RATE: 80 BPM | RESPIRATION RATE: 18 BRPM | BODY MASS INDEX: 38.92 KG/M2 | SYSTOLIC BLOOD PRESSURE: 120 MMHG | WEIGHT: 256 LBS

## 2017-04-06 LAB
ANION GAP BLD CALC-SCNC: 8 MMOL/L (ref 7–16)
APTT PPP: 36.3 SEC (ref 23.5–31.7)
APTT PPP: 92.8 SEC (ref 23.5–31.7)
BUN SERPL-MCNC: 9 MG/DL (ref 6–23)
CALCIUM SERPL-MCNC: 8.5 MG/DL (ref 8.3–10.4)
CHLORIDE SERPL-SCNC: 109 MMOL/L (ref 98–107)
CO2 SERPL-SCNC: 26 MMOL/L (ref 21–32)
CREAT SERPL-MCNC: 1.26 MG/DL (ref 0.8–1.5)
CRP SERPL-MCNC: 0.8 MG/DL (ref 0–0.9)
D DIMER PPP FEU-MCNC: 2.91 UG/ML(FEU)
ERYTHROCYTE [DISTWIDTH] IN BLOOD BY AUTOMATED COUNT: 22.5 % (ref 11.9–14.6)
ERYTHROCYTE [SEDIMENTATION RATE] IN BLOOD: 16 MM/HR (ref 0–20)
GLUCOSE SERPL-MCNC: 98 MG/DL (ref 65–100)
HCT VFR BLD AUTO: 29.1 % (ref 41.1–50.3)
HCT VFR BLD AUTO: 29.4 % (ref 41.1–50.3)
HGB BLD-MCNC: 8.1 G/DL (ref 13.6–17.2)
HGB BLD-MCNC: 8.1 G/DL (ref 13.6–17.2)
MCH RBC QN AUTO: 16.9 PG (ref 26.1–32.9)
MCHC RBC AUTO-ENTMCNC: 27.8 G/DL (ref 31.4–35)
MCV RBC AUTO: 60.9 FL (ref 79.6–97.8)
PLATELET # BLD AUTO: 314 K/UL (ref 150–450)
PMV BLD AUTO: ABNORMAL FL (ref 10.8–14.1)
POTASSIUM SERPL-SCNC: 3.6 MMOL/L (ref 3.5–5.1)
RBC # BLD AUTO: 4.78 M/UL (ref 4.23–5.67)
SODIUM SERPL-SCNC: 143 MMOL/L (ref 136–145)
WBC # BLD AUTO: 5.4 K/UL (ref 4.3–11.1)

## 2017-04-06 PROCEDURE — 85730 THROMBOPLASTIN TIME PARTIAL: CPT | Performed by: INTERNAL MEDICINE

## 2017-04-06 PROCEDURE — 36415 COLL VENOUS BLD VENIPUNCTURE: CPT | Performed by: INTERNAL MEDICINE

## 2017-04-06 PROCEDURE — 77030012893

## 2017-04-06 PROCEDURE — 86140 C-REACTIVE PROTEIN: CPT | Performed by: INTERNAL MEDICINE

## 2017-04-06 PROCEDURE — 74011250637 HC RX REV CODE- 250/637: Performed by: INTERNAL MEDICINE

## 2017-04-06 PROCEDURE — 85379 FIBRIN DEGRADATION QUANT: CPT | Performed by: INTERNAL MEDICINE

## 2017-04-06 PROCEDURE — 85018 HEMOGLOBIN: CPT | Performed by: HOSPITALIST

## 2017-04-06 PROCEDURE — 74011250636 HC RX REV CODE- 250/636: Performed by: HOSPITALIST

## 2017-04-06 PROCEDURE — 80048 BASIC METABOLIC PNL TOTAL CA: CPT | Performed by: INTERNAL MEDICINE

## 2017-04-06 PROCEDURE — 85652 RBC SED RATE AUTOMATED: CPT | Performed by: INTERNAL MEDICINE

## 2017-04-06 PROCEDURE — 74011250636 HC RX REV CODE- 250/636: Performed by: FAMILY MEDICINE

## 2017-04-06 PROCEDURE — 85027 COMPLETE CBC AUTOMATED: CPT | Performed by: INTERNAL MEDICINE

## 2017-04-06 RX ORDER — PANTOPRAZOLE SODIUM 40 MG/1
40 TABLET, DELAYED RELEASE ORAL
Status: DISCONTINUED | OUTPATIENT
Start: 2017-04-06 | End: 2017-04-06 | Stop reason: HOSPADM

## 2017-04-06 RX ORDER — PANTOPRAZOLE SODIUM 40 MG/1
40 TABLET, DELAYED RELEASE ORAL DAILY
Qty: 30 TAB | Refills: 0 | Status: SHIPPED | OUTPATIENT
Start: 2017-04-06

## 2017-04-06 RX ORDER — LANOLIN ALCOHOL/MO/W.PET/CERES
325 CREAM (GRAM) TOPICAL
Qty: 90 TAB | Refills: 1 | Status: SHIPPED | OUTPATIENT
Start: 2017-04-06

## 2017-04-06 RX ORDER — TRAMADOL HYDROCHLORIDE 50 MG/1
50 TABLET ORAL
Qty: 40 TAB | Refills: 0 | Status: SHIPPED | OUTPATIENT
Start: 2017-04-06

## 2017-04-06 RX ORDER — HEPARIN SODIUM 5000 [USP'U]/ML
35 INJECTION, SOLUTION INTRAVENOUS; SUBCUTANEOUS ONCE
Status: COMPLETED | OUTPATIENT
Start: 2017-04-06 | End: 2017-04-06

## 2017-04-06 RX ORDER — FOLIC ACID 1 MG/1
1 TABLET ORAL DAILY
Qty: 30 TAB | Refills: 0 | Status: SHIPPED | OUTPATIENT
Start: 2017-04-06

## 2017-04-06 RX ORDER — LANOLIN ALCOHOL/MO/W.PET/CERES
1 CREAM (GRAM) TOPICAL
Status: DISCONTINUED | OUTPATIENT
Start: 2017-04-06 | End: 2017-04-06 | Stop reason: HOSPADM

## 2017-04-06 RX ADMIN — PANTOPRAZOLE SODIUM 40 MG: 40 TABLET, DELAYED RELEASE ORAL at 08:38

## 2017-04-06 RX ADMIN — HEPARIN SODIUM AND DEXTROSE 22 UNITS/KG/HR: 5000; 5 INJECTION INTRAVENOUS at 08:45

## 2017-04-06 RX ADMIN — RIVAROXABAN 15 MG: 15 TABLET, FILM COATED ORAL at 15:54

## 2017-04-06 RX ADMIN — HEPARIN SODIUM AND DEXTROSE 20 UNITS/KG/HR: 5000; 5 INJECTION INTRAVENOUS at 11:00

## 2017-04-06 RX ADMIN — HEPARIN SODIUM 4050 UNITS: 5000 INJECTION, SOLUTION INTRAVENOUS; SUBCUTANEOUS at 03:12

## 2017-04-06 RX ADMIN — FERROUS SULFATE TAB 325 MG (65 MG ELEMENTAL FE) 325 MG: 325 (65 FE) TAB at 13:30

## 2017-04-06 RX ADMIN — FERROUS SULFATE TAB 325 MG (65 MG ELEMENTAL FE) 325 MG: 325 (65 FE) TAB at 08:38

## 2017-04-06 NOTE — PROGRESS NOTES
Xarelto is the drug of choice and researched the patient assistance programs. Patient and spouse bring in approx 79,000  dollars per year between them and therefore are over income for [employer].     The prive of the drug would be 350.00 per month and we have a one month free card. Patient reports he cannot afford the 350.00 per month. Will discuss with MD.   ? Coumadin. Luba Iglesias Oz

## 2017-04-06 NOTE — DISCHARGE SUMMARY
Discharge Summary     Patient: Dorene Pete MRN: [id number]  SSN: [id number]    YOB: 1975  Age: 38 y.o. Sex: male       Admit Date: 4/3/2017    Discharge Date: 4/6/2017      Admission Diagnoses: Iron deficiency anemia, unspecified iron deficiency anemia type [D50.9]  Anemia, unspecified type [D64.9]    Discharge Diagnoses:   Problem List as of 4/6/2017  Never Reviewed          Codes Class Noted - Resolved    Symptomatic anemia ICD-10-CM: D64.9  ICD-9-CM: 285.9  4/3/2017 - Present        Obesity ICD-10-CM: E66.9  ICD-9-CM: 278.00  4/3/2017 - Present        Deep vein thrombosis (DVT) of left lower extremity ([facility] [address] ICD-10-CM: I82.402  ICD-9-CM: 453.40  4/3/2017 - Present               Discharge Condition: [place] Course:   Dorene Pete is a 38 y.o. male that presented to the ED with a 2 week history of dyspnea. He denied associated chest pain. He also reported LLE pain and swelling several weeks ago that spontaneously resolved. 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 Work up in the ED showed Hgb 6.6 with stool reportedly heme negative and LLE DVT. He denied overt blood loss including hematemesis, coffee ground emesis, hematochezia, melena, or hematuria. He routinely takes Ibuprofen 200 mg once daily. The patient was initially admitted to [facility], [facility], a chest CT scan showed bilateral PE. He was started on IV heparin and was transferred to the [facility]. He was transfused with 2 U of pRBCs and his Hgb remained stable around 8.0 after that. He was seen by GI , EGD and colonoscopy done and no evidence of bleeding found. He was seen by hematology, anticoagulation with xarelto recommended. Patient was started on xarelto today, and he will need to follow up with GI for capsule endoscopy, he will also follow with hematology and he received an appointment at the [facility] for follow up with a PCP.  He was given oral iron and folic acid at discharge. He was advised to come back to the ER if he has ANY evidence of bleeding. GENERAL: alert, cooperative, no distress, appears stated age  EYE: conjunctivae/corneas clear. PERRL, EOM's intact. Fundi benign  LYMPHATIC: Cervical, supraclavicular, and axillary nodes normal.   THROAT & NECK: normal and no erythema or exudates noted. LUNG: clear to auscultation bilaterally  HEART: regular rate and rhythm, S1, S2 normal, no murmur, click, rub or gallop  ABDOMEN: soft, non-tender. Bowel sounds normal. No masses, no organomegaly  EXTREMITIES: extremities normal, atraumatic, no cyanosis or edema  SKIN: Normal.  NEUROLOGIC: AOx3. Gait normal. Reflexes and motor strength normal and symmetric. Cranial nerves 2-12 and sensation grossly intact. PSYCHIATRIC: non focal    Consults: Gastroenterology and Hematology/Oncology    Significant Diagnostic Studies: see hospital course     Disposition: home    Discharge Medications:     Current Facility-Administered Medications:     pantoprazole (PROTONIX) tablet 40 mg, 40 mg, Oral, ACB&D, Harper Gloria MD, 40 mg at 04/06/17 1518    ferrous sulfate tablet 325 mg, 1 Tab, Oral, TID WITH MEALS, Harshad Smyth MD, 325 mg at 04/06/17 1330    0.9% sodium chloride infusion 250 mL, 250 mL, IntraVENous, PRN, Shruthi Mayers MD    lactated ringers infusion, 100 mL/hr, IntraVENous, CONTINUOUS, Owen Vann MD, Last Rate: 100 mL/hr at 04/04/17 1352, 100 mL/hr at 04/04/17 1352    famotidine (PEPCID) tablet 20 mg, 20 mg, Oral, PRN, Owen Vann MD    0.9% sodium chloride infusion, 100 mL/hr, IntraVENous, CONTINUOUS, Harper Gloria MD, Last Rate: 100 mL/hr at 04/05/17 1951, 100 mL/hr at 04/05/17 1951    Current Outpatient Prescriptions:     ferrous sulfate 325 mg (65 mg iron) tablet, Take 1 Tab by mouth three (3) times daily (with meals). , Disp: 90 Tab, Rfl: 1    pantoprazole (PROTONIX) 40 mg tablet, Take 1 Tab by mouth daily. , Disp: 30 Tab, Rfl: 0    rivaroxaban (XARELTO) 15 mg tab tablet, Take 1 Tab by mouth two (2) times daily (with meals) for 21 days. , Disp: 42 Tab, Rfl: 0    rivaroxaban (XARELTO) 20 mg tab tablet, Take 1 Tab by mouth daily (with dinner). , Disp: 30 Tab, Rfl: 0    traMADol (ULTRAM) 50 mg tablet, Take 1 Tab by mouth every eight (8) hours as needed for Pain. Max Daily Amount: 150 mg., Disp: 40 Tab, Rfl: 0    folic acid (FOLVITE) 1 mg tablet, Take 1 Tab by mouth daily. , Disp: 30 Tab, Rfl: 0      Activity: Activity as tolerated  Diet: Regular Diet  Wound Care: None needed    Follow-up Appointments   Procedures    FOLLOW UP VISIT Appointment in: One Week PCP     PCP     Standing Status:   Standing     Number of Occurrences:   1     Order Specific Question:   Appointment in     Answer: One Week    FOLLOW UP VISIT Appointment in: Ten Days GI     GI     Standing Status:   Standing     Number of Occurrences:   1     Standing Expiration Date:   4/7/2017     Order Specific Question:   Appointment in     Answer:   [de-identified] Days    FOLLOW UP VISIT Appointment in: Two Weeks hematology     hematology     Standing Status:   Standing     Number of Occurrences:   1     Standing Expiration Date:   4/7/2017     Order Specific Question:   Appointment in     Answer:    Two Weeks       Signed By: Kunal Sosa MD     April 6, 2017

## 2017-04-06 NOTE — INTERDISCIPLINARY ROUNDS
Interdisciplinary team rounds were held 4/6/2017 with the following team members:Care Management, Nursing, Pastoral Care, Pharmacy and Physician and the patient and spouse. Plan of care discussed. See clinical pathway and/or care plan for interventions and desired outcomes. Patient and spouse verbalize understanding regarding plan of care.

## 2017-04-06 NOTE — PROGRESS NOTES
Discharge instructions and prescriptions given and reviewed with pt, verbalizes understanding, medication side effect sheet reviewed with pt, pt to be discharged home after given Xarelto by primary RN.

## 2017-04-06 NOTE — PROGRESS NOTES
Inpatient Hematology / Oncology Progress Note      Admission Date: 4/3/2017  7:59 PM  Reason for Admission/Hospital Course: Iron deficiency anemia, unspecified iron deficiency anemia type [D50.9]  Anemia, unspecified type [D64.9]      24 Hour Events:  No new complaints  IV iron infusing  VS stable      ROS:  Constitutional:  negative for fever, chills, weakness, malaise, fatigue. CV: negative for chest pain, palpitations, edema. Respiratory: negative for dyspnea, cough, wheezing. GI: negative for nausea, abdominal pain, diarrhea. 10 point review of systems is otherwise negative with the exception of the elements mentioned above in the HPI. No Known Allergies    OBJECTIVE:  Patient Vitals for the past 8 hrs:   BP Temp Pulse Resp SpO2   17 1028 121/72 97.8 °F (36.6 °C) 71 18 98 %   17 0702 97/53 97.7 °F (36.5 °C) 64 18 98 %     Temp (24hrs), Av.1 °F (36.7 °C), Min:97.4 °F (36.3 °C), Max:99.4 °F (37.4 °C)     07 -  1900  In: 240 [P.O.:240]  Out: -     Physical Exam:  Constitutional: Well developed, well nourished male in no acute distress, lying comfortably in the hospital bed with wife   HEENT: Normocephalic and atraumatic. t.    Lymph node   Deferred   Skin Warm and dry. Respiratory Lungs are clear to auscultation bilaterally without wheezes, rales or rhonchi, normal air exchange without accessory muscle use. CVS Normal rate, regular rhythm and normal S1 and S2. No murmurs, gallops, or rubs. Neuro Grossly nonfocal with no obvious sensory or motor deficits. MSK Normal range of motion in general.  No edema and no tenderness. Psych Appropriate mood and affect.         Labs:    Recent Labs      17   0729  17   0145  17   0618   17   1540   WBC  5.4   --   6.4   --   9.8   RBC  4.78   --   4.61   --   4.49   HGB  8.1*  8.1*  7.6*   < >  6.6*   HCT  29.1*  29.4*  28.4*   < >  26.3*   MCV  60.9*   --   61.6*   --   58.6*   MCH  16.9*   -- 16.5*   --   14.7*   MCHC  27.8*   --   26.8*   --   25.1*   RDW  22.5*   --   22.1*   --   19.0*   PLT  314   --   384   --   378   GRANS   --    --    --    --   78*   LYMPH   --    --    --    --   16   MONOS   --    --    --    --   5   EOS   --    --    --    --   1   DF   --    --    --    --   MANUAL   ANEU   --    --    --    --   7.6   ABL   --    --    --    --   1.6   ABM   --    --    --    --   0.5   ALAYNA   --    --    --    --   0.1    < > = values in this interval not displayed. Recent Labs      04/06/17   0729  04/05/17   0618  04/03/17   1540   NA  143  142  137   K  3.6  3.8  3.6   CL  109*  108*  103   CO2  26  26  28   AGAP  8  8  6*   GLU  98  85  103*   BUN  9  8  17   CREA  1.26  1.00  1.17   GFRAA  >60  >60  >60   GFRNA  >60  >60  >60   CA  8.5  8.7  9.0   SGOT   --   13*  14*   AP   --   73  77   TP   --   6.3  7.5   ALB   --   3.2*  3.8   GLOB   --   3.1  3.7*   AGRAT   --   1.0*  1.0*         Imaging:      ASSESSMENT:    Problem List  Never Reviewed          Codes Class Noted    Symptomatic anemia ICD-10-CM: D64.9  ICD-9-CM: 285.9  4/3/2017        Obesity ICD-10-CM: E66.9  ICD-9-CM: 278.00  4/3/2017        Deep vein thrombosis (DVT) of left lower extremity (HCC) ICD-10-CM: I82.402  ICD-9-CM: 453.40  4/3/2017                PLAN:    Anemia  4/5 IV Iron today. EGD wnl. Colonoscopy today. 4/6 He received IV iron today. EGD and Colonoscopy both revealed no source of bleeding. GI is considering doing capsule endoscopy.       DVT/PE  4/5 Continue with heparin and bridge to Xarelto for 9-12 months prior to discharge. Follow up with Dr. Sahil Segundo once discharged-hypercoag work up in office. 4/6 Patient does not qualify for any assistance for Xarelto so he will need to be bridged to coumadin with Lovenox until his INR is therapeutic.      Spoke with  regarding plan to bridge patient to coumadin.  She and hospitalist are currently working on plan and will inform us prior to his discharge. Thank you for allowing us to participate in the care of Mr. Jeong. Alize Mathur NP   [facility]  [address] [id number] [facility]  Office : [phone]  Fax : [phone]               Attending Addendum:  I personally evaluated the patient with Alize Mathur N.P.,  and agree with the assessment, findings and plan as documented. Appears stable, heart regular without murmur, lungs clear, abdomen benign. IV infed 1gm x 1. F/u in hematology within 2-3 weeks of discahrge.                Joselin Nava MD  [facility]  [address] [facility]  [address] [facility]  Office : [phone]  Fax : [phone]

## 2017-04-06 NOTE — DISCHARGE INSTRUCTIONS
DISCHARGE SUMMARY from Nurse    The following personal items are in your possession at time of discharge:    Dental Appliances: None        Home Medications: None  Jewelry: None  Clothing: At bedside  Other Valuables: Cell Phone, Wallet             PATIENT INSTRUCTIONS:    After general anesthesia or intravenous sedation, for 24 hours or while taking prescription Narcotics:  · Limit your activities  · Do not drive and operate hazardous machinery  · Do not make important personal or business decisions  · Do  not drink alcoholic beverages  · If you have not urinated within 8 hours after discharge, please contact your surgeon on call. Report the following to your surgeon:  · Excessive pain, swelling, redness or odor of or around the surgical area  · Temperature over 100.5  · Nausea and vomiting lasting longer than 4 hours or if unable to take medications  · Any signs of decreased circulation or nerve impairment to extremity: change in color, persistent  numbness, tingling, coldness or increase pain  · Any questions        What to do at Home:  Recommended activity: Activity as tolerated    If you experience any of the following symptoms increased shortness of breath, leg swelling or pain, blood in stool, vomiting blood, chest pain, or excessive fatigue, please follow up with your primary care physician. *  Please give a list of your current medications to your Primary Care Provider. *  Please update this list whenever your medications are discontinued, doses are      changed, or new medications (including over-the-counter products) are added. *  Please carry medication information at all times in case of emergency situations. These are general instructions for a healthy lifestyle:    No smoking/ No tobacco products/ Avoid exposure to second hand smoke    Surgeon General's Warning:  Quitting smoking now greatly reduces serious risk to your health.     Obesity, smoking, and sedentary lifestyle greatly increases your risk for illness    A healthy diet, regular physical exercise & weight monitoring are important for maintaining a healthy lifestyle    You may be retaining fluid if you have a history of heart failure or if you experience any of the following symptoms:  Weight gain of 3 pounds or more overnight or 5 pounds in a week, increased swelling in our hands or feet or shortness of breath while lying flat in bed. Please call your doctor as soon as you notice any of these symptoms; do not wait until your next office visit. Recognize signs and symptoms of STROKE:    F-face looks uneven    A-arms unable to move or move unevenly    S-speech slurred or non-existent    T-time-call 911 as soon as signs and symptoms begin-DO NOT go       Back to bed or wait to see if you get better-TIME IS BRAIN. Warning Signs of HEART ATTACK     Call 911 if you have these symptoms:   Chest discomfort. Most heart attacks involve discomfort in the center of the chest that lasts more than a few minutes, or that goes away and comes back. It can feel like uncomfortable pressure, squeezing, fullness, or pain.  Discomfort in other areas of the upper body. Symptoms can include pain or discomfort in one or both arms, the back, neck, jaw, or stomach.  Shortness of breath with or without chest discomfort.  Other signs may include breaking out in a cold sweat, nausea, or lightheadedness. Don't wait more than five minutes to call 911 - MINUTES MATTER! Fast action can save your life. Calling 911 is almost always the fastest way to get lifesaving treatment. Emergency Medical Services staff can begin treatment when they arrive -- up to an hour sooner than if someone gets to the hospital by car. The discharge information has been reviewed with the patient. The patient verbalized understanding.     Discharge medications reviewed with the patient and appropriate educational materials and side effects teaching were provided. Anemia: Care Instructions  Your Care Instructions    Anemia is a low level of red blood cells, which carry oxygen throughout your body. Many things can cause anemia. Lack of iron is one of the most common causes. Your body needs iron to make hemoglobin, a substance in red blood cells that carries oxygen from the lungs to your body's cells. Without enough iron, the body produces fewer and smaller red blood cells. As a result, your body's cells do not get enough oxygen, and you feel tired and weak. And you may have trouble concentrating. Bleeding is the most common cause of a lack of iron. You may have heavy menstrual bleeding or bleeding caused by conditions such as ulcers, hemorrhoids, or cancer. Regular use of aspirin or other anti-inflammatory medicines (such as ibuprofen) also can cause bleeding in some people. A lack of iron in your diet also can cause anemia, especially at times when the body needs more iron, such as during pregnancy, infancy, and the teen years. Your doctor may have prescribed iron pills. It may take several months of treatment for your iron levels to return to normal. Your doctor also may suggest that you eat foods that are rich in iron, such as meat and beans. There are many other causes of anemia. It is not always due to a lack of iron. Finding the specific cause of your anemia will help your doctor find the right treatment for you. Follow-up care is a key part of your treatment and safety. Be sure to make and go to all appointments, and call your doctor if you are having problems. It's also a good idea to know your test results and keep a list of the medicines you take. How can you care for yourself at home? · Take your medicines exactly as prescribed. Call your doctor if you think you are having a problem with your medicine.   · If your doctor recommends iron pills, take them as directed:  ¨ Try to take the pills on an empty stomach about 1 hour before or 2 hours after meals. But you may need to take iron with food to avoid an upset stomach. ¨ Do not take antacids or drink milk or caffeine drinks (such as coffee, tea, or cola) at the same time or within 2 hours of the time that you take your iron. They can make it hard for your body to absorb the iron. ¨ Vitamin C (from food or supplements) helps your body absorb iron. Try taking iron pills with a glass of orange juice or some other food that is high in vitamin C, such as citrus fruits. ¨ Iron pills may cause stomach problems, such as heartburn, nausea, diarrhea, constipation, and cramps. Be sure to drink plenty of fluids, and include fruits, vegetables, and fiber in your diet each day. Iron pills often make your bowel movements dark or green. ¨ If you forget to take an iron pill, do not take a double dose of iron the next time you take a pill. ¨ Keep iron pills out of the reach of small children. An overdose of iron can be very dangerous. · Follow your doctor's advice about eating iron-rich foods. These include red meat, shellfish, poultry, eggs, beans, raisins, whole-grain bread, and leafy green vegetables. · Steam vegetables to help them keep their iron content. When should you call for help? Call 911 anytime you think you may need emergency care. For example, call if:  · You have symptoms of a heart attack. These may include:  ¨ Chest pain or pressure, or a strange feeling in the chest.  ¨ Sweating. ¨ Shortness of breath. ¨ Nausea or vomiting. ¨ Pain, pressure, or a strange feeling in the back, neck, jaw, or upper belly or in one or both shoulders or arms. ¨ Lightheadedness or sudden weakness. ¨ A fast or irregular heartbeat. After you call 911, the  may tell you to chew 1 adult-strength or 2 to 4 low-dose aspirin. Wait for an ambulance. Do not try to drive yourself. · You passed out (lost consciousness).   Call your doctor now or seek immediate medical care if:  · You have new or increased shortness of breath. · You are dizzy or lightheaded, or you feel like you may faint. · Your fatigue and weakness continue or get worse. · You have any abnormal bleeding, such as:  ¨ Nosebleeds. ¨ Vaginal bleeding that is different (heavier, more frequent, at a different time of the month) than what you are used to. ¨ Bloody or black stools, or rectal bleeding. ¨ Bloody or pink urine. Watch closely for changes in your health, and be sure to contact your doctor if:  · You do not get better as expected. Where can you learn more? Go to http://masoud-daryl.info/. Enter R301 in the search box to learn more about \"Anemia: Care Instructions. \"  Current as of: October 13, 2016  Content Version: 11.2  © 9914-6523 [employer]. Care instructions adapted under license by [employer] (which disclaims liability or warranty for this information). If you have questions about a medical condition or this instruction, always ask your healthcare professional. [place] [address] any warranty or liability for your use of this information.

## 2017-04-06 NOTE — PROGRESS NOTES
Case management watching progress. Patient does not have Medical insurance. Will work with MD on economical anticoag. Patient is a [place] resident and will have to use the [facility] for follow up. Lyla Knutson

## 2017-04-08 LAB
GLIADIN PEPTIDE IGA SER-ACNC: 2 UNITS (ref 0–19)
GLIADIN PEPTIDE IGG SER-ACNC: 3 UNITS (ref 0–19)
IGA SERPL-MCNC: 153 MG/DL (ref 90–386)
TTG IGA SER-ACNC: <2 U/ML (ref 0–3)
TTG IGG SER-ACNC: 8 U/ML (ref 0–5)

## 2017-04-13 ENCOUNTER — HOSPITAL ENCOUNTER (OUTPATIENT)
Dept: INFUSION THERAPY | Age: 42
Discharge: HOME | End: 2017-04-13

## 2017-04-18 RX ORDER — SODIUM CHLORIDE 0.9 % (FLUSH) 0.9 %
5-10 SYRINGE (ML) INJECTION AS NEEDED
Status: CANCELLED | OUTPATIENT
Start: 2017-04-18

## 2017-04-18 RX ORDER — SODIUM CHLORIDE 9 MG/ML
500 INJECTION, SOLUTION INTRAVENOUS ONCE
Status: CANCELLED | OUTPATIENT
Start: 2017-04-18 | End: 2017-04-18

## 2017-04-19 ENCOUNTER — HOSPITAL ENCOUNTER (OUTPATIENT)
Dept: LAB | Age: 42
Discharge: HOME | End: 2017-04-19
Payer: SELF-PAY

## 2017-04-19 ENCOUNTER — HOSPITAL ENCOUNTER (OUTPATIENT)
Dept: INFUSION THERAPY | Age: 42
Discharge: HOME | End: 2017-04-19

## 2017-04-19 DIAGNOSIS — I82.402 DEEP VEIN THROMBOSIS (DVT) OF LEFT LOWER EXTREMITY, UNSPECIFIED CHRONICITY, UNSPECIFIED VEIN (HCC): ICD-10-CM

## 2017-04-19 LAB
ALBUMIN SERPL BCP-MCNC: 4 G/DL (ref 3.5–5)
ALBUMIN/GLOB SERPL: 1.1 {RATIO} (ref 1.2–3.5)
ALP SERPL-CCNC: 78 U/L (ref 50–136)
ALT SERPL-CCNC: 35 U/L (ref 12–65)
ANION GAP BLD CALC-SCNC: 6 MMOL/L (ref 7–16)
AST SERPL W P-5'-P-CCNC: 31 U/L (ref 15–37)
BASOPHILS # BLD AUTO: 0 K/UL (ref 0–0.2)
BASOPHILS # BLD: 1 % (ref 0–2)
BILIRUB SERPL-MCNC: 0.5 MG/DL (ref 0.2–1.1)
BUN SERPL-MCNC: 9 MG/DL (ref 6–23)
CALCIUM SERPL-MCNC: 9.4 MG/DL (ref 8.3–10.4)
CHLORIDE SERPL-SCNC: 104 MMOL/L (ref 98–107)
CO2 SERPL-SCNC: 28 MMOL/L (ref 21–32)
CREAT SERPL-MCNC: 1.06 MG/DL (ref 0.8–1.5)
D DIMER PPP FEU-MCNC: 0.48 UG/ML(FEU)
DIFFERENTIAL METHOD BLD: ABNORMAL
EOSINOPHIL # BLD: 0 K/UL (ref 0–0.8)
EOSINOPHIL NFR BLD: 1 % (ref 0.5–7.8)
GLOBULIN SER CALC-MCNC: 3.7 G/DL (ref 2.3–3.5)
GLUCOSE SERPL-MCNC: 95 MG/DL (ref 65–100)
HCT VFR BLD AUTO: 43.3 % (ref 41.1–50.3)
HGB BLD-MCNC: 12.3 G/DL (ref 13.6–17.2)
LYMPHOCYTES # BLD AUTO: 26 % (ref 13–44)
LYMPHOCYTES # BLD: 1.2 K/UL (ref 0.5–4.6)
MCH RBC QN AUTO: 21.2 PG (ref 26.1–32.9)
MCHC RBC AUTO-ENTMCNC: 28.4 G/DL (ref 31.4–35)
MCV RBC AUTO: 74.8 FL (ref 79.6–97.8)
MONOCYTES # BLD: 0.4 K/UL (ref 0.1–1.3)
MONOCYTES NFR BLD AUTO: 9 % (ref 4–12)
NEUTS SEG # BLD: 3 K/UL (ref 1.7–8.2)
NEUTS SEG NFR BLD AUTO: 63 % (ref 43–78)
NRBC # BLD: 0 K/UL (ref 0–0.2)
PLATELET # BLD AUTO: 368 K/UL (ref 150–450)
PLATELET COMMENTS,PCOM: ADEQUATE
PMV BLD AUTO: 9.4 FL (ref 10.8–14.1)
POTASSIUM SERPL-SCNC: 4.6 MMOL/L (ref 3.5–5.1)
PROT SERPL-MCNC: 7.7 G/DL (ref 6.3–8.2)
RBC # BLD AUTO: 5.79 M/UL (ref 4.23–5.67)
RBC MORPH BLD: ABNORMAL
SODIUM SERPL-SCNC: 138 MMOL/L (ref 136–145)
TRANSFERRIN SERPL-MCNC: 312 MG/DL (ref 202–364)
WBC # BLD AUTO: 4.6 K/UL (ref 4.3–11.1)
WBC MORPH BLD: ABNORMAL

## 2017-04-19 PROCEDURE — 81241 F5 GENE: CPT | Performed by: INTERNAL MEDICINE

## 2017-04-19 PROCEDURE — 85379 FIBRIN DEGRADATION QUANT: CPT | Performed by: INTERNAL MEDICINE

## 2017-04-19 PROCEDURE — 85610 PROTHROMBIN TIME: CPT | Performed by: INTERNAL MEDICINE

## 2017-04-19 PROCEDURE — 86146 BETA-2 GLYCOPROTEIN ANTIBODY: CPT | Performed by: INTERNAL MEDICINE

## 2017-04-19 PROCEDURE — 85300 ANTITHROMBIN III ACTIVITY: CPT | Performed by: INTERNAL MEDICINE

## 2017-04-19 PROCEDURE — 85025 COMPLETE CBC W/AUTO DIFF WBC: CPT | Performed by: INTERNAL MEDICINE

## 2017-04-19 PROCEDURE — 84466 ASSAY OF TRANSFERRIN: CPT | Performed by: INTERNAL MEDICINE

## 2017-04-19 PROCEDURE — 85301 ANTITHROMBIN III ANTIGEN: CPT | Performed by: INTERNAL MEDICINE

## 2017-04-19 PROCEDURE — 80053 COMPREHEN METABOLIC PANEL: CPT | Performed by: INTERNAL MEDICINE

## 2017-04-19 PROCEDURE — 86147 CARDIOLIPIN ANTIBODY EA IG: CPT | Performed by: INTERNAL MEDICINE

## 2017-04-19 PROCEDURE — 85303 CLOT INHIBIT PROT C ACTIVITY: CPT | Performed by: INTERNAL MEDICINE

## 2017-04-19 PROCEDURE — 36415 COLL VENOUS BLD VENIPUNCTURE: CPT | Performed by: INTERNAL MEDICINE

## 2017-04-19 PROCEDURE — 85306 CLOT INHIBIT PROT S FREE: CPT | Performed by: INTERNAL MEDICINE

## 2017-04-19 PROCEDURE — 81240 F2 GENE: CPT | Performed by: INTERNAL MEDICINE

## 2017-04-20 ENCOUNTER — APPOINTMENT (OUTPATIENT)
Dept: INFUSION THERAPY | Age: 42
End: 2017-04-20

## 2017-04-21 LAB
B2 GLYCOPROT1 IGA SER-ACNC: 10 GPI IGA UNITS (ref 0–25)
B2 GLYCOPROT1 IGG SER-ACNC: <9 GPI IGG UNITS (ref 0–20)
B2 GLYCOPROT1 IGM SER-ACNC: <9 GPI IGM UNITS (ref 0–32)

## 2017-04-21 NOTE — PROGRESS NOTES
SW attempted to call patient at [phone] in response to request for work note. ADRIEL unable to leave message as voicemail is full. We are only able to give excuse for the dates that the patient was in hospital - if patient needs a note to release back to work he will have to obtain from his primary care physician-     From chart review it looks as if follow up appointment scheduled on April 17 at [employer].

## 2017-04-22 LAB
CARDIOLIPIN IGA SER IA-ACNC: <9 APL U/ML (ref 0–11)
CARDIOLIPIN IGG SER IA-ACNC: <9 GPL U/ML (ref 0–14)
CARDIOLIPIN IGM SER IA-ACNC: <9 MPL U/ML (ref 0–12)

## 2017-04-24 LAB
AT III ACT/NOR PPP CHRO: NORMAL %
AT III AG ACT/NOR PPP IA: NORMAL %
LUPUS ANTICOAG PANEL, XMLUPT: NORMAL
PROT C ACT/NOR PPP CHRO: NORMAL %
PROT S ACT/NOR PPP CHRO: NORMAL %

## 2017-04-25 LAB
FACTOR V LEIDEN MUTATION, XMF5LT: NORMAL %
PROTHROMBIN G2021A MUTATION, XMPTMT: NORMAL

## 2017-07-06 ENCOUNTER — HOSPITAL ENCOUNTER (OUTPATIENT)
Dept: ULTRASOUND IMAGING | Age: 42
Discharge: HOME | End: 2017-07-06
Attending: INTERNAL MEDICINE
Payer: SELF-PAY

## 2017-07-06 DIAGNOSIS — I82.402 DEEP VEIN THROMBOSIS (DVT) OF LEFT LOWER EXTREMITY, UNSPECIFIED CHRONICITY, UNSPECIFIED VEIN (HCC): ICD-10-CM

## 2017-07-06 DIAGNOSIS — M79.89 LEG SWELLING: ICD-10-CM

## 2017-07-06 PROCEDURE — 93970 EXTREMITY STUDY: CPT

## 2017-07-10 ENCOUNTER — HOSPITAL ENCOUNTER (OUTPATIENT)
Dept: LAB | Age: 42
Discharge: HOME | End: 2017-07-10
Payer: SELF-PAY

## 2017-07-10 DIAGNOSIS — I82.402 DEEP VEIN THROMBOSIS (DVT) OF LEFT LOWER EXTREMITY, UNSPECIFIED CHRONICITY, UNSPECIFIED VEIN (HCC): ICD-10-CM

## 2017-07-10 DIAGNOSIS — D50.9 IRON DEFICIENCY ANEMIA, UNSPECIFIED IRON DEFICIENCY ANEMIA TYPE: ICD-10-CM

## 2017-07-10 LAB
ALBUMIN SERPL BCP-MCNC: 3.5 G/DL (ref 3.5–5)
ALBUMIN/GLOB SERPL: 1 {RATIO}
ALP SERPL-CCNC: 81 U/L (ref 50–136)
ALT SERPL-CCNC: 33 U/L (ref 12–65)
ANION GAP BLD CALC-SCNC: 8 MMOL/L
AST SERPL W P-5'-P-CCNC: 20 U/L (ref 15–37)
BASOPHILS # BLD AUTO: 0 K/UL (ref 0–0.2)
BASOPHILS # BLD: 1 % (ref 0–2)
BILIRUB SERPL-MCNC: 0.5 MG/DL (ref 0.2–1.1)
BUN SERPL-MCNC: 12 MG/DL (ref 6–23)
CALCIUM SERPL-MCNC: 8.9 MG/DL (ref 8.3–10.4)
CHLORIDE SERPL-SCNC: 105 MMOL/L (ref 98–107)
CO2 SERPL-SCNC: 28 MMOL/L (ref 21–32)
CREAT SERPL-MCNC: 1.1 MG/DL (ref 0.8–1.5)
D DIMER PPP FEU-MCNC: <0.19 UG/ML(FEU)
DIFFERENTIAL METHOD BLD: ABNORMAL
EOSINOPHIL # BLD: 0.1 K/UL (ref 0–0.8)
EOSINOPHIL NFR BLD: 2 % (ref 0.5–7.8)
ERYTHROCYTE [DISTWIDTH] IN BLOOD BY AUTOMATED COUNT: 11.9 % (ref 11.9–14.6)
FERRITIN SERPL-MCNC: 21 NG/ML (ref 8–388)
GLOBULIN SER CALC-MCNC: 3.6 G/DL
GLUCOSE SERPL-MCNC: 92 MG/DL (ref 65–100)
HCT VFR BLD AUTO: 44 % (ref 41.1–50.3)
HGB BLD-MCNC: 15.6 G/DL (ref 13.6–17.2)
HGB RETIC QN AUTO: 37 PG (ref 29–35)
IMM RETICS NFR: 10 % (ref 2.3–13.4)
IRON SATN MFR SERPL: 26 %
IRON SERPL-MCNC: 92 UG/DL (ref 35–150)
LYMPHOCYTES # BLD AUTO: 25 % (ref 13–44)
LYMPHOCYTES # BLD: 1.2 K/UL (ref 0.5–4.6)
MCH RBC QN AUTO: 29.9 PG (ref 26.1–32.9)
MCHC RBC AUTO-ENTMCNC: 35.5 G/DL (ref 31.4–35)
MCV RBC AUTO: 84.5 FL (ref 79.6–97.8)
MONOCYTES # BLD: 0.6 K/UL (ref 0.1–1.3)
MONOCYTES NFR BLD AUTO: 12 % (ref 4–12)
NEUTS SEG # BLD: 3 K/UL (ref 1.7–8.2)
NEUTS SEG NFR BLD AUTO: 61 % (ref 43–78)
NRBC # BLD: 0 K/UL (ref 0–0.2)
PLATELET # BLD AUTO: 251 K/UL (ref 150–450)
PMV BLD AUTO: 9.5 FL (ref 10.8–14.1)
POTASSIUM SERPL-SCNC: 3.9 MMOL/L (ref 3.5–5.1)
PROT SERPL-MCNC: 7.1 G/DL (ref 6.3–8.2)
RBC # BLD AUTO: 5.21 M/UL (ref 4.23–5.67)
RETICS # AUTO: 0.1 M/UL (ref 0.03–0.1)
RETICS/RBC NFR AUTO: 2 % (ref 0.3–2)
SODIUM SERPL-SCNC: 141 MMOL/L (ref 136–145)
TIBC SERPL-MCNC: 349 UG/DL (ref 250–450)
WBC # BLD AUTO: 4.9 K/UL (ref 4.3–11.1)

## 2017-07-10 PROCEDURE — 85025 COMPLETE CBC W/AUTO DIFF WBC: CPT | Performed by: INTERNAL MEDICINE

## 2017-07-10 PROCEDURE — 36415 COLL VENOUS BLD VENIPUNCTURE: CPT | Performed by: INTERNAL MEDICINE

## 2017-07-10 PROCEDURE — 85379 FIBRIN DEGRADATION QUANT: CPT | Performed by: INTERNAL MEDICINE

## 2017-07-10 PROCEDURE — 80053 COMPREHEN METABOLIC PANEL: CPT | Performed by: INTERNAL MEDICINE

## 2017-07-10 PROCEDURE — 82728 ASSAY OF FERRITIN: CPT | Performed by: INTERNAL MEDICINE

## 2017-07-10 PROCEDURE — 83540 ASSAY OF IRON: CPT | Performed by: INTERNAL MEDICINE

## 2017-07-10 PROCEDURE — 85046 RETICYTE/HGB CONCENTRATE: CPT | Performed by: INTERNAL MEDICINE

## 2017-09-14 ENCOUNTER — HOSPITAL ENCOUNTER (OUTPATIENT)
Dept: ULTRASOUND IMAGING | Age: 42
Discharge: HOME | End: 2017-09-14
Attending: INTERNAL MEDICINE
Payer: SELF-PAY

## 2017-09-14 DIAGNOSIS — I82.402 DEEP VEIN THROMBOSIS (DVT) OF LEFT LOWER EXTREMITY, UNSPECIFIED CHRONICITY, UNSPECIFIED VEIN (HCC): ICD-10-CM

## 2017-09-14 PROCEDURE — 93971 EXTREMITY STUDY: CPT

## 2018-07-09 ENCOUNTER — APPOINTMENT (OUTPATIENT)
Dept: GENERAL RADIOLOGY | Age: 43
End: 2018-07-09
Attending: EMERGENCY MEDICINE
Payer: COMMERCIAL

## 2018-07-09 PROCEDURE — 80053 COMPREHEN METABOLIC PANEL: CPT | Performed by: EMERGENCY MEDICINE

## 2018-07-09 PROCEDURE — 84484 ASSAY OF TROPONIN QUANT: CPT | Performed by: EMERGENCY MEDICINE

## 2018-07-09 PROCEDURE — 85025 COMPLETE CBC W/AUTO DIFF WBC: CPT | Performed by: EMERGENCY MEDICINE

## 2018-07-09 PROCEDURE — 99284 EMERGENCY DEPT VISIT MOD MDM: CPT | Performed by: EMERGENCY MEDICINE

## 2018-07-09 PROCEDURE — 71046 X-RAY EXAM CHEST 2 VIEWS: CPT

## 2018-07-09 PROCEDURE — 93005 ELECTROCARDIOGRAM TRACING: CPT | Performed by: EMERGENCY MEDICINE

## 2018-07-10 ENCOUNTER — HOSPITAL ENCOUNTER (EMERGENCY)
Age: 43
Discharge: HOME | End: 2018-07-10
Attending: EMERGENCY MEDICINE
Payer: COMMERCIAL

## 2018-07-10 VITALS
HEART RATE: 70 BPM | BODY MASS INDEX: 41.22 KG/M2 | HEIGHT: 68 IN | RESPIRATION RATE: 16 BRPM | DIASTOLIC BLOOD PRESSURE: 72 MMHG | TEMPERATURE: 97.8 F | WEIGHT: 272 LBS | OXYGEN SATURATION: 99 % | SYSTOLIC BLOOD PRESSURE: 106 MMHG

## 2018-07-10 DIAGNOSIS — R07.9 CHEST PAIN, UNSPECIFIED TYPE: Primary | ICD-10-CM

## 2018-07-10 LAB
ALBUMIN SERPL-MCNC: 3.7 G/DL (ref 3.5–5)
ALBUMIN/GLOB SERPL: 0.9 {RATIO} (ref 1.2–3.5)
ALP SERPL-CCNC: 85 U/L (ref 50–136)
ALT SERPL-CCNC: 43 U/L (ref 12–65)
ANION GAP SERPL CALC-SCNC: 9 MMOL/L (ref 7–16)
AST SERPL-CCNC: 29 U/L (ref 15–37)
ATRIAL RATE: 81 BPM
BASOPHILS # BLD: 0 K/UL (ref 0–0.2)
BASOPHILS NFR BLD: 0 % (ref 0–2)
BILIRUB SERPL-MCNC: 0.6 MG/DL (ref 0.2–1.1)
BUN SERPL-MCNC: 14 MG/DL (ref 6–23)
CALCIUM SERPL-MCNC: 9 MG/DL (ref 8.3–10.4)
CALCULATED P AXIS, ECG09: 27 DEGREES
CALCULATED R AXIS, ECG10: 25 DEGREES
CALCULATED T AXIS, ECG11: 2 DEGREES
CHLORIDE SERPL-SCNC: 105 MMOL/L (ref 98–107)
CO2 SERPL-SCNC: 29 MMOL/L (ref 21–32)
CREAT SERPL-MCNC: 1.28 MG/DL (ref 0.8–1.5)
DIAGNOSIS, 93000: NORMAL
DIFFERENTIAL METHOD BLD: ABNORMAL
EOSINOPHIL # BLD: 0.1 K/UL (ref 0–0.8)
EOSINOPHIL NFR BLD: 1 % (ref 0.5–7.8)
ERYTHROCYTE [DISTWIDTH] IN BLOOD BY AUTOMATED COUNT: 12.1 % (ref 11.9–14.6)
GLOBULIN SER CALC-MCNC: 3.9 G/DL (ref 2.3–3.5)
GLUCOSE SERPL-MCNC: 91 MG/DL (ref 65–100)
HCT VFR BLD AUTO: 44.5 % (ref 41.1–50.3)
HGB BLD-MCNC: 15.8 G/DL (ref 13.6–17.2)
IMM GRANULOCYTES # BLD: 0 K/UL (ref 0–0.5)
IMM GRANULOCYTES NFR BLD AUTO: 0 % (ref 0–5)
LYMPHOCYTES # BLD: 1.6 K/UL (ref 0.5–4.6)
LYMPHOCYTES NFR BLD: 22 % (ref 13–44)
MCH RBC QN AUTO: 31.4 PG (ref 26.1–32.9)
MCHC RBC AUTO-ENTMCNC: 35.5 G/DL (ref 31.4–35)
MCV RBC AUTO: 88.5 FL (ref 79.6–97.8)
MONOCYTES # BLD: 0.7 K/UL (ref 0.1–1.3)
MONOCYTES NFR BLD: 10 % (ref 4–12)
NEUTS SEG # BLD: 4.6 K/UL (ref 1.7–8.2)
NEUTS SEG NFR BLD: 67 % (ref 43–78)
P-R INTERVAL, ECG05: 174 MS
PLATELET # BLD AUTO: 283 K/UL (ref 150–450)
PMV BLD AUTO: 10 FL (ref 10.8–14.1)
POTASSIUM SERPL-SCNC: 3.6 MMOL/L (ref 3.5–5.1)
PROT SERPL-MCNC: 7.6 G/DL (ref 6.3–8.2)
Q-T INTERVAL, ECG07: 370 MS
QRS DURATION, ECG06: 92 MS
QTC CALCULATION (BEZET), ECG08: 429 MS
RBC # BLD AUTO: 5.03 M/UL (ref 4.23–5.67)
SODIUM SERPL-SCNC: 143 MMOL/L (ref 136–145)
TROPONIN I SERPL-MCNC: <0.02 NG/ML (ref 0.02–0.05)
VENTRICULAR RATE, ECG03: 81 BPM
WBC # BLD AUTO: 7 K/UL (ref 4.3–11.1)

## 2018-07-10 RX ORDER — GUAIFENESIN 100 MG/5ML
324 LIQUID (ML) ORAL
Status: DISCONTINUED | OUTPATIENT
Start: 2018-07-10 | End: 2018-07-10 | Stop reason: HOSPADM

## 2018-07-10 NOTE — DISCHARGE INSTRUCTIONS
Chest Pain: Care Instructions  Your Care Instructions    There are many things that can cause chest pain. Some are not serious and will get better on their own in a few days. But some kinds of chest pain need more testing and treatment. Your doctor may have recommended a follow-up visit in the next 8 to 12 hours. If you are not getting better, you may need more tests or treatment. Even though your doctor has released you, you still need to watch for any problems. The doctor carefully checked you, but sometimes problems can develop later. If you have new symptoms or if your symptoms do not get better, get medical care right away. If you have worse or different chest pain or pressure that lasts more than 5 minutes or you passed out (lost consciousness), call 911 or seek other emergency help right away. A medical visit is only one step in your treatment. Even if you feel better, you still need to do what your doctor recommends, such as going to all suggested follow-up appointments and taking medicines exactly as directed. This will help you recover and help prevent future problems. How can you care for yourself at home? · Rest until you feel better. · Take your medicine exactly as prescribed. Call your doctor if you think you are having a problem with your medicine. · Do not drive after taking a prescription pain medicine. When should you call for help? Call 911 if:  ? · You passed out (lost consciousness). ? · You have severe difficulty breathing. ? · You have symptoms of a heart attack. These may include:  ¨ Chest pain or pressure, or a strange feeling in your chest.  ¨ Sweating. ¨ Shortness of breath. ¨ Nausea or vomiting. ¨ Pain, pressure, or a strange feeling in your back, neck, jaw, or upper belly or in one or both shoulders or arms. ¨ Lightheadedness or sudden weakness. ¨ A fast or irregular heartbeat.   After you call 911, the  may tell you to chew 1 adult-strength or 2 to 4 low-dose aspirin. Wait for an ambulance. Do not try to drive yourself. ?Call your doctor today if:  ? · You have any trouble breathing. ? · Your chest pain gets worse. ? · You are dizzy or lightheaded, or you feel like you may faint. ? · You are not getting better as expected. ? · You are having new or different chest pain. Where can you learn more? Go to http://masoud-daryl.info/. Enter A120 in the search box to learn more about \"Chest Pain: Care Instructions. \"  Current as of: March 20, 2017  Content Version: 11.4  © 4870-7761 [employer]. Care instructions adapted under license by [employer] (which disclaims liability or warranty for this information). If you have questions about a medical condition or this instruction, always ask your healthcare professional. [address] any warranty or liability for your use of this information.

## 2018-07-10 NOTE — ED PROVIDER NOTES
HPI Comments: Patient with numbness in his small and ring finger on the left hand for the past week. Intermittently. Tonight for hours ago had lateral left sharp chest pain lasting a second or 2. No shortness of breath nausea or diaphoresis. Had another episode here when he got here. None since. Patient is a 41 y.o. male presenting with chest pain. The history is provided by the patient. No  was used. Chest Pain    This is a new problem. The current episode started 3 to 5 hours ago. The problem has been resolved. The pain is associated with normal activity. The pain is present in the left side. The pain is moderate. The quality of the pain is described as sharp. The pain does not radiate. Associated symptoms include numbness. Pertinent negatives include no abdominal pain, no back pain, no cough, no diaphoresis, no dizziness, no exertional chest pressure, no fever, no headaches, no irregular heartbeat, no leg pain, no lower extremity edema, no malaise/fatigue, no nausea, no near-syncope, no palpitations, no shortness of breath, no vomiting and no weakness. He has tried nothing for the symptoms. Risk factors include male gender. His past medical history does not include DM or HTN. Past Medical History:   Diagnosis Date    Ill-defined condition     Heart murmur        Past Surgical History:   Procedure Laterality Date    COLONOSCOPY N/A 4/5/2017    COLONOSCOPY  ROOM 607 performed by Timothy Grey MD at [facility] ENDOSCOPY    HX ORTHOPAEDIC      right foot          Family History:   Problem Relation Age of Onset    Lupus Mother        Social History     Social History    Marital status:      Spouse name: N/A    Number of children: N/A    Years of education: N/A     Occupational History    Not on file.      Social History Main Topics    Smoking status: Never Smoker    Smokeless tobacco: Not on file    Alcohol use No    Drug use: Not on file    Sexual activity: Not on file     Other Topics Concern    Not on file     Social History Narrative         ALLERGIES: Review of patient's allergies indicates no known allergies. Review of Systems   Constitutional: Negative for chills, diaphoresis, fever and malaise/fatigue. HENT: Negative for rhinorrhea and sore throat. Eyes: Negative for pain and redness. Respiratory: Negative for cough, chest tightness, shortness of breath and wheezing. Cardiovascular: Positive for chest pain. Negative for palpitations, leg swelling and near-syncope. Gastrointestinal: Negative for abdominal pain, diarrhea, nausea and vomiting. Genitourinary: Negative for dysuria and hematuria. Musculoskeletal: Negative for back pain, gait problem, neck pain and neck stiffness. Skin: Negative for color change and rash. Neurological: Positive for numbness. Negative for dizziness, weakness and headaches. Vitals:    07/09/18 2336   BP: 120/75   Pulse: 84   Resp: 16   Temp: 97.8 °F (36.6 °C)   SpO2: 98%   Weight: 123.4 kg (272 lb)   Height: 5' 8\" (1.727 m)            Physical Exam   Constitutional: He is oriented to person, place, and time. He appears well-developed and well-nourished. HENT:   Head: Normocephalic and atraumatic. Neck: Normal range of motion. Neck supple. Cardiovascular: Normal rate and regular rhythm. No murmur heard. Pulmonary/Chest: Effort normal and breath sounds normal. He has no wheezes. He exhibits no tenderness. Abdominal: Soft. Bowel sounds are normal. There is no tenderness. Musculoskeletal: Normal range of motion. He exhibits no edema. Neurological: He is alert and oriented to person, place, and time. Sensation intact in fingers on left hand. Radial pulse intact. Strength intact. Skin: Skin is warm and dry. Nursing note and vitals reviewed. MDM  Number of Diagnoses or Management Options  Diagnosis management comments: nno EKG changes and negative troponin. No chest pain.   We'll refer to cardiology for further evaluation. Amount and/or Complexity of Data Reviewed  Clinical lab tests: ordered and reviewed  Tests in the radiology section of CPT®: ordered and reviewed  Tests in the medicine section of CPT®: ordered and reviewed    Patient Progress  Patient progress: stable        ED Course       Procedures    EKG: normal sinus rhythm, nonspecific ST and T waves changes. Rate 81. XR CHEST PA LAT (Final result) Result time: 07/09/18 23:57:40     Final result by Mundo Hernandez MD (07/09/18 23:57:40)     Impression:     IMPRESSION: No acute abnormality     Narrative:     Chest 2 view dated 7/9/2018    Prior exam 4/3/2017    CLINICAL INFORMATION: Chest pain    The heart is normal in size. There is a hiatal hernia posterior to the heart. Mediastinum otherwise unremarkable. Pulmonary vascularity normal and lungs  clear. No pleural effusion.           Results Include:    Recent Results (from the past 24 hour(s))   TROPONIN I    Collection Time: 07/09/18 11:46 PM   Result Value Ref Range    Troponin-I, Qt. <0.02 (L) 0.02 - 0.05 NG/ML   CBC WITH AUTOMATED DIFF    Collection Time: 07/09/18 11:46 PM   Result Value Ref Range    WBC 7.0 4.3 - 11.1 K/uL    RBC 5.03 4.23 - 5.67 M/uL    HGB 15.8 13.6 - 17.2 g/dL    HCT 44.5 41.1 - 50.3 %    MCV 88.5 79.6 - 97.8 FL    MCH 31.4 26.1 - 32.9 PG    MCHC 35.5 (H) 31.4 - 35.0 g/dL    RDW 12.1 11.9 - 14.6 %    PLATELET 521 568 - 215 K/uL    MPV 10.0 (L) 10.8 - 14.1 FL    DF AUTOMATED      NEUTROPHILS 67 43 - 78 %    LYMPHOCYTES 22 13 - 44 %    MONOCYTES 10 4.0 - 12.0 %    EOSINOPHILS 1 0.5 - 7.8 %    BASOPHILS 0 0.0 - 2.0 %    IMMATURE GRANULOCYTES 0 0.0 - 5.0 %    ABS. NEUTROPHILS 4.6 1.7 - 8.2 K/UL    ABS. LYMPHOCYTES 1.6 0.5 - 4.6 K/UL    ABS. MONOCYTES 0.7 0.1 - 1.3 K/UL    ABS. EOSINOPHILS 0.1 0.0 - 0.8 K/UL    ABS. BASOPHILS 0.0 0.0 - 0.2 K/UL    ABS. IMM.  GRANS. 0.0 0.0 - 0.5 K/UL   METABOLIC PANEL, COMPREHENSIVE    Collection Time: 07/09/18 11:46 PM   Result Value Ref Range    Sodium 143 136 - 145 mmol/L    Potassium 3.6 3.5 - 5.1 mmol/L    Chloride 105 98 - 107 mmol/L    CO2 29 21 - 32 mmol/L    Anion gap 9 7 - 16 mmol/L    Glucose 91 65 - 100 mg/dL    BUN 14 6 - 23 MG/DL    Creatinine 1.28 0.8 - 1.5 MG/DL    GFR est AA >60 >60 ml/min/1.73m2    GFR est non-AA >60 >60 ml/min/1.73m2    Calcium 9.0 8.3 - 10.4 MG/DL    Bilirubin, total 0.6 0.2 - 1.1 MG/DL    ALT (SGPT) 43 12 - 65 U/L    AST (SGOT) 29 15 - 37 U/L    Alk.  phosphatase 85 50 - 136 U/L    Protein, total 7.6 6.3 - 8.2 g/dL    Albumin 3.7 3.5 - 5.0 g/dL    Globulin 3.9 (H) 2.3 - 3.5 g/dL    A-G Ratio 0.9 (L) 1.2 - 3.5

## 2018-07-10 NOTE — ED NOTES
I have reviewed discharge instructions with the patient. The patient verbalized understanding. Patient left ED via Discharge Method: ambulatory to Home with [facility]    Opportunity for questions and clarification provided. Patient given 0 scripts. To continue your aftercare when you leave the hospital, you may receive an automated call from our care team to check in on how you are doing. This is a free service and part of our promise to provide the best care and service to meet your aftercare needs.  If you have questions, or wish to unsubscribe from this service please call [phone]. Thank you for Choosing our [facility].

## 2018-07-10 NOTE — ED TRIAGE NOTES
\"I have been having numbness in my left hand for about a week and tonight about 2 hours ago I started having some pains in my chest\"

## 2021-04-09 NOTE — PROGRESS NOTES
Progress Note    Patient: Ankur Hudson MRN: [id number]  SSN: [id number]    YOB: 1975  Age: 38 y.o. Sex: male      Admit Date: 4/3/2017    LOS: 2 days     Subjective:     Seen at bedside. Admitted with DVT+ PE + severe ferropenic  Anemia. On a heparin drip. EGD yesterday  was negative. Colonoscopy today was unremarkable. Will resume heparin drip. Hematology has been consulted. Objective:     Vitals:    04/04/17 2021 04/05/17 0027 04/05/17 0205 04/05/17 0256   BP: 107/55 90/52 100/55 110/57   Pulse: 73 73  67   Resp: 17 16  18   Temp: 98.4 °F (36.9 °C)   97.6 °F (36.4 °C)   SpO2: 98% 96%  97%        Intake and Output:  Current Shift: 04/05 0701 - 04/05 1900  In: 957 [I.V.:957]  Out: -   Last three shifts: 04/03 1901 - 04/05 0700  In: 360 [P.O.:360]  Out: 0     Physical Exam:   GENERAL: alert, cooperative, no distress, appears stated age  EYE: conjunctivae/corneas clear. PERRL, EOM's intact. Fundi benign  LYMPHATIC: Cervical, supraclavicular, and axillary nodes normal.   THROAT & NECK: normal and no erythema or exudates noted. LUNG: clear to auscultation bilaterally  HEART: regular rate and rhythm, S1, S2 normal, no murmur, click, rub or gallop  ABDOMEN: soft, non-tender. Bowel sounds normal. No masses,  no organomegaly  EXTREMITIES:  extremities normal, atraumatic, no cyanosis or edema  SKIN: Normal.  NEUROLOGIC: AOx3. Gait normal. Reflexes and motor strength normal and symmetric. Cranial nerves 2-12 and sensation grossly intact. PSYCHIATRIC: non focal    Lab/Data Review:  Lab results reviewed. For significant abnormal values and values requiring intervention, see assessment and plan.          Assessment:     Active Problems:    Symptomatic anemia (4/3/2017)        Plan:     # severe ferropenic anemia of unclear etiology   -EGD and colonoscopy were negative   -GI planning on a capsule endoscopy   -Hgb level q6h , transfuse if Hgb is close to  or below 7     #SEVERE LLE DVT + PE   -continue heparin drip   -will decide on oral anticoagulation choice tomorrow         Signed By: Oleg Phillips MD     April 5, 2017 H/O adenoidectomy  AT 8 Y.O  H/O sinus surgery  2005  History of tonsillectomy  AT 8 YEARS OLD

## 2025-05-01 NOTE — ED NOTES
Pt being transferred to [facility] via [facility] ambulance. Heparin drip infusing while in route.     Beena Vega RN left upper arm

## (undated) DEVICE — NDL PRT INJ NSAF BLNT 18GX1.5 --

## (undated) DEVICE — SYR 5ML 1/5 GRAD LL NSAF LF --

## (undated) DEVICE — CANNULA NSL ORAL AD FOR CAPNOFLEX CO2 O2 AIRLFE

## (undated) DEVICE — CONNECTOR TBNG OD5-7MM O2 END DISP

## (undated) DEVICE — SYR 3ML LL TIP 1/10ML GRAD --

## (undated) DEVICE — BLOCK BITE AD 60FR W/ VELC STRP ADDRESSES MOST PT AND

## (undated) DEVICE — KENDALL RADIOLUCENT FOAM MONITORING ELECTRODE RECTANGULAR SHAPE: Brand: KENDALL